# Patient Record
Sex: FEMALE | Race: WHITE | Employment: OTHER | URBAN - NONMETROPOLITAN AREA
[De-identification: names, ages, dates, MRNs, and addresses within clinical notes are randomized per-mention and may not be internally consistent; named-entity substitution may affect disease eponyms.]

---

## 2019-10-13 ENCOUNTER — TRANSFERRED RECORDS (OUTPATIENT)
Dept: HEALTH INFORMATION MANAGEMENT | Facility: OTHER | Age: 69
End: 2019-10-13

## 2019-12-19 ENCOUNTER — TRANSFERRED RECORDS (OUTPATIENT)
Dept: HEALTH INFORMATION MANAGEMENT | Facility: OTHER | Age: 69
End: 2019-12-19

## 2019-12-19 ENCOUNTER — MEDICAL CORRESPONDENCE (OUTPATIENT)
Dept: HEALTH INFORMATION MANAGEMENT | Facility: OTHER | Age: 69
End: 2019-12-19

## 2020-01-08 ENCOUNTER — HOSPITAL ENCOUNTER (OUTPATIENT)
Dept: CT IMAGING | Facility: OTHER | Age: 70
Discharge: HOME | End: 2020-01-08
Admitting: FAMILY MEDICINE
Payer: COMMERCIAL

## 2020-01-08 ENCOUNTER — HOSPITAL ENCOUNTER (OUTPATIENT)
Dept: CARDIOLOGY | Facility: OTHER | Age: 70
End: 2020-01-08
Payer: COMMERCIAL

## 2020-01-08 VITALS — BODY MASS INDEX: 32.15 KG/M2 | WEIGHT: 193 LBS | HEIGHT: 65 IN

## 2020-01-08 DIAGNOSIS — Z95.1 STATUS POST CORONARY ARTERY BYPASS GRAFT: ICD-10-CM

## 2020-01-08 DIAGNOSIS — I10 HYPERTENSION, UNSPECIFIED TYPE: ICD-10-CM

## 2020-01-08 DIAGNOSIS — Z95.0 PACEMAKER: ICD-10-CM

## 2020-01-08 DIAGNOSIS — R31.9 HEMATURIA: ICD-10-CM

## 2020-01-08 DIAGNOSIS — Z95.2 HX OF AORTIC VALVE REPLACEMENT: ICD-10-CM

## 2020-01-08 DIAGNOSIS — R06.02 SHORTNESS OF BREATH: ICD-10-CM

## 2020-01-08 DIAGNOSIS — Z95.2 HISTORY OF MITRAL VALVE REPLACEMENT: ICD-10-CM

## 2020-01-08 PROCEDURE — 93306 TTE W/DOPPLER COMPLETE: CPT | Mod: 26 | Performed by: INTERNAL MEDICINE

## 2020-01-08 PROCEDURE — 93306 TTE W/DOPPLER COMPLETE: CPT

## 2020-01-08 PROCEDURE — 25500064 ZZH RX 255 OP 636

## 2020-01-08 PROCEDURE — 74178 CT ABD&PLV WO CNTR FLWD CNTR: CPT

## 2020-01-08 RX ADMIN — IOHEXOL 100 ML: 350 INJECTION, SOLUTION INTRAVENOUS at 14:46

## 2020-01-08 ASSESSMENT — MIFFLIN-ST. JEOR: SCORE: 1410.7

## 2020-01-14 RX ORDER — WARFARIN SODIUM 2.5 MG/1
5 TABLET ORAL DAILY
COMMUNITY
Start: 2019-09-11

## 2020-01-14 RX ORDER — ACETAMINOPHEN 500 MG
1000 TABLET ORAL
COMMUNITY
Start: 2019-09-11

## 2020-01-14 RX ORDER — ALBUTEROL SULFATE 90 UG/1
AEROSOL, METERED RESPIRATORY (INHALATION)
COMMUNITY
Start: 2019-12-19

## 2020-01-14 RX ORDER — LEVOTHYROXINE SODIUM 88 UG/1
88 TABLET ORAL
COMMUNITY
Start: 2019-09-25

## 2020-01-14 RX ORDER — FUROSEMIDE 40 MG
20 TABLET ORAL DAILY
COMMUNITY
Start: 2019-12-30 | End: 2020-01-15

## 2020-01-14 RX ORDER — ASPIRIN 81 MG/1
81 TABLET ORAL
COMMUNITY
Start: 2007-03-08

## 2020-01-14 RX ORDER — INSULIN GLARGINE 100 [IU]/ML
INJECTION, SOLUTION SUBCUTANEOUS
COMMUNITY
Start: 2019-12-09

## 2020-01-14 RX ORDER — ATENOLOL 25 MG/1
12.5 TABLET ORAL DAILY
COMMUNITY
Start: 2020-01-08 | End: 2020-01-15

## 2020-01-14 RX ORDER — OMEPRAZOLE 40 MG/1
40 CAPSULE, DELAYED RELEASE ORAL
COMMUNITY
Start: 2019-03-25

## 2020-01-14 RX ORDER — POTASSIUM CHLORIDE 750 MG/1
TABLET, EXTENDED RELEASE ORAL
COMMUNITY
Start: 2019-12-30

## 2020-01-14 RX ORDER — ATORVASTATIN CALCIUM 40 MG/1
40 TABLET, FILM COATED ORAL
COMMUNITY
Start: 2019-02-14

## 2020-01-14 RX ORDER — INSULIN ASPART 100 [IU]/ML
INJECTION, SOLUTION INTRAVENOUS; SUBCUTANEOUS
COMMUNITY
Start: 2020-01-01

## 2020-01-15 ENCOUNTER — OFFICE VISIT (OUTPATIENT)
Dept: UROLOGY | Facility: OTHER | Age: 70
End: 2020-01-15
Attending: UROLOGY
Payer: COMMERCIAL

## 2020-01-15 ENCOUNTER — OFFICE VISIT (OUTPATIENT)
Dept: CARDIOLOGY | Facility: OTHER | Age: 70
End: 2020-01-15
Payer: COMMERCIAL

## 2020-01-15 VITALS
BODY MASS INDEX: 32.49 KG/M2 | HEART RATE: 84 BPM | DIASTOLIC BLOOD PRESSURE: 88 MMHG | RESPIRATION RATE: 16 BRPM | WEIGHT: 195 LBS | SYSTOLIC BLOOD PRESSURE: 178 MMHG

## 2020-01-15 VITALS
HEIGHT: 65 IN | OXYGEN SATURATION: 97 % | RESPIRATION RATE: 18 BRPM | TEMPERATURE: 98.4 F | DIASTOLIC BLOOD PRESSURE: 76 MMHG | BODY MASS INDEX: 32.32 KG/M2 | HEART RATE: 88 BPM | WEIGHT: 194 LBS | SYSTOLIC BLOOD PRESSURE: 154 MMHG

## 2020-01-15 DIAGNOSIS — I48.91 POSTOPERATIVE ATRIAL FIBRILLATION (H): ICD-10-CM

## 2020-01-15 DIAGNOSIS — Z95.2 HISTORY OF MITRAL VALVE REPLACEMENT WITH MECHANICAL VALVE: ICD-10-CM

## 2020-01-15 DIAGNOSIS — K21.9 GASTROESOPHAGEAL REFLUX DISEASE WITHOUT ESOPHAGITIS: ICD-10-CM

## 2020-01-15 DIAGNOSIS — E11.22 CONTROLLED TYPE 2 DIABETES MELLITUS WITH CHRONIC KIDNEY DISEASE ON CHRONIC DIALYSIS, WITH LONG-TERM CURRENT USE OF INSULIN (H): ICD-10-CM

## 2020-01-15 DIAGNOSIS — S22.32XD CLOSED FRACTURE OF ONE RIB OF LEFT SIDE WITH ROUTINE HEALING, SUBSEQUENT ENCOUNTER: ICD-10-CM

## 2020-01-15 DIAGNOSIS — D64.9 ANEMIA, UNSPECIFIED TYPE: ICD-10-CM

## 2020-01-15 DIAGNOSIS — Z79.2 NEED FOR PROPHYLACTIC ANTIBIOTIC: ICD-10-CM

## 2020-01-15 DIAGNOSIS — I25.10 CORONARY ARTERY DISEASE INVOLVING NATIVE HEART WITHOUT ANGINA PECTORIS, UNSPECIFIED VESSEL OR LESION TYPE: Primary | ICD-10-CM

## 2020-01-15 DIAGNOSIS — I34.2 NONRHEUMATIC MITRAL VALVE STENOSIS: ICD-10-CM

## 2020-01-15 DIAGNOSIS — Z95.2 STATUS POST AORTIC VALVE REPLACEMENT: ICD-10-CM

## 2020-01-15 DIAGNOSIS — R52 PAIN IN PACEMAKER POCKET: ICD-10-CM

## 2020-01-15 DIAGNOSIS — Z95.1 HISTORY OF CORONARY ARTERY BYPASS GRAFT X 1: ICD-10-CM

## 2020-01-15 DIAGNOSIS — Z95.0 CARDIAC PACEMAKER IN SITU: ICD-10-CM

## 2020-01-15 DIAGNOSIS — C82.80: ICD-10-CM

## 2020-01-15 DIAGNOSIS — I10 ESSENTIAL HYPERTENSION: ICD-10-CM

## 2020-01-15 DIAGNOSIS — I27.20 PULMONARY HYPERTENSION (H): ICD-10-CM

## 2020-01-15 DIAGNOSIS — N18.6 CONTROLLED TYPE 2 DIABETES MELLITUS WITH CHRONIC KIDNEY DISEASE ON CHRONIC DIALYSIS, WITH LONG-TERM CURRENT USE OF INSULIN (H): ICD-10-CM

## 2020-01-15 DIAGNOSIS — R31.0 GROSS HEMATURIA: Primary | ICD-10-CM

## 2020-01-15 DIAGNOSIS — I97.89 POSTOPERATIVE ATRIAL FIBRILLATION (H): ICD-10-CM

## 2020-01-15 DIAGNOSIS — E03.9 HYPOTHYROIDISM, UNSPECIFIED TYPE: ICD-10-CM

## 2020-01-15 DIAGNOSIS — I35.0 SEVERE AORTIC STENOSIS: ICD-10-CM

## 2020-01-15 DIAGNOSIS — C81.90 HODGKIN LYMPHOMA, UNSPECIFIED HODGKIN LYMPHOMA TYPE, UNSPECIFIED BODY REGION (H): ICD-10-CM

## 2020-01-15 DIAGNOSIS — Z95.0 PACEMAKER: ICD-10-CM

## 2020-01-15 DIAGNOSIS — Z79.01 CHRONIC ANTICOAGULATION: ICD-10-CM

## 2020-01-15 DIAGNOSIS — R06.09 DOE (DYSPNEA ON EXERTION): ICD-10-CM

## 2020-01-15 DIAGNOSIS — Z99.2 CONTROLLED TYPE 2 DIABETES MELLITUS WITH CHRONIC KIDNEY DISEASE ON CHRONIC DIALYSIS, WITH LONG-TERM CURRENT USE OF INSULIN (H): ICD-10-CM

## 2020-01-15 DIAGNOSIS — Z79.4 CONTROLLED TYPE 2 DIABETES MELLITUS WITH CHRONIC KIDNEY DISEASE ON CHRONIC DIALYSIS, WITH LONG-TERM CURRENT USE OF INSULIN (H): ICD-10-CM

## 2020-01-15 PROBLEM — G35 MS (MULTIPLE SCLEROSIS) (H): Status: ACTIVE | Noted: 2020-01-15

## 2020-01-15 LAB
ALBUMIN SERPL-MCNC: 4.3 G/DL (ref 3.5–5.7)
ALP SERPL-CCNC: 67 U/L (ref 34–104)
ALT SERPL W P-5'-P-CCNC: 20 U/L (ref 7–52)
ANION GAP SERPL CALCULATED.3IONS-SCNC: 7 MMOL/L (ref 3–14)
AST SERPL W P-5'-P-CCNC: 56 U/L (ref 13–39)
BILIRUB SERPL-MCNC: 1.2 MG/DL (ref 0.3–1)
BUN SERPL-MCNC: 36 MG/DL (ref 7–25)
CALCIUM SERPL-MCNC: 9.9 MG/DL (ref 8.6–10.3)
CHLORIDE SERPL-SCNC: 109 MMOL/L (ref 98–107)
CO2 SERPL-SCNC: 25 MMOL/L (ref 21–31)
CREAT SERPL-MCNC: 1.16 MG/DL (ref 0.6–1.2)
ERYTHROCYTE [DISTWIDTH] IN BLOOD BY AUTOMATED COUNT: 24.1 % (ref 10–15)
FERRITIN SERPL-MCNC: 35 NG/ML (ref 23.9–336.2)
GFR SERPL CREATININE-BSD FRML MDRD: 47 ML/MIN/{1.73_M2}
GLUCOSE SERPL-MCNC: 136 MG/DL (ref 70–105)
HCT VFR BLD AUTO: 29.1 % (ref 35–47)
HGB BLD-MCNC: 8.6 G/DL (ref 11.7–15.7)
IRON SATN MFR SERPL: 10 % (ref 20–55)
IRON SERPL-MCNC: 42 UG/DL (ref 50–212)
MAGNESIUM SERPL-MCNC: 2.2 MG/DL (ref 1.9–2.7)
MCH RBC QN AUTO: 28.6 PG (ref 26.5–33)
MCHC RBC AUTO-ENTMCNC: 29.6 G/DL (ref 31.5–36.5)
MCV RBC AUTO: 97 FL (ref 78–100)
NT-PROBNP SERPL-MCNC: 479 PG/ML (ref 0–100)
PLATELET # BLD AUTO: 384 10E9/L (ref 150–450)
POTASSIUM SERPL-SCNC: 4.6 MMOL/L (ref 3.5–5.1)
PROT SERPL-MCNC: 7.9 G/DL (ref 6.4–8.9)
RBC # BLD AUTO: 3.01 10E12/L (ref 3.8–5.2)
SODIUM SERPL-SCNC: 141 MMOL/L (ref 134–144)
TIBC SERPL-MCNC: 432.6 UG/DL (ref 245–400)
UIBC (UNSATURATED): 390.6 MG/DL
WBC # BLD AUTO: 7.2 10E9/L (ref 4–11)

## 2020-01-15 PROCEDURE — 88112 CYTOPATH CELL ENHANCE TECH: CPT

## 2020-01-15 PROCEDURE — 83550 IRON BINDING TEST: CPT | Mod: ZL | Performed by: NURSE PRACTITIONER

## 2020-01-15 PROCEDURE — 85027 COMPLETE CBC AUTOMATED: CPT | Mod: ZL | Performed by: NURSE PRACTITIONER

## 2020-01-15 PROCEDURE — G0463 HOSPITAL OUTPT CLINIC VISIT: HCPCS | Mod: 25

## 2020-01-15 PROCEDURE — 82728 ASSAY OF FERRITIN: CPT | Mod: ZL | Performed by: NURSE PRACTITIONER

## 2020-01-15 PROCEDURE — 99203 OFFICE O/P NEW LOW 30 MIN: CPT | Performed by: NURSE PRACTITIONER

## 2020-01-15 PROCEDURE — 83540 ASSAY OF IRON: CPT | Mod: ZL | Performed by: NURSE PRACTITIONER

## 2020-01-15 PROCEDURE — 83880 ASSAY OF NATRIURETIC PEPTIDE: CPT | Mod: ZL | Performed by: NURSE PRACTITIONER

## 2020-01-15 PROCEDURE — 99204 OFFICE O/P NEW MOD 45 MIN: CPT | Mod: 25 | Performed by: UROLOGY

## 2020-01-15 PROCEDURE — 80053 COMPREHEN METABOLIC PANEL: CPT | Mod: ZL | Performed by: NURSE PRACTITIONER

## 2020-01-15 PROCEDURE — 93000 ELECTROCARDIOGRAM COMPLETE: CPT | Performed by: INTERNAL MEDICINE

## 2020-01-15 PROCEDURE — 36415 COLL VENOUS BLD VENIPUNCTURE: CPT | Mod: ZL | Performed by: NURSE PRACTITIONER

## 2020-01-15 PROCEDURE — 83735 ASSAY OF MAGNESIUM: CPT | Mod: ZL | Performed by: NURSE PRACTITIONER

## 2020-01-15 PROCEDURE — 52000 CYSTOURETHROSCOPY: CPT | Performed by: UROLOGY

## 2020-01-15 RX ORDER — BUDESONIDE AND FORMOTEROL FUMARATE DIHYDRATE 160; 4.5 UG/1; UG/1
2 AEROSOL RESPIRATORY (INHALATION) 2 TIMES DAILY
COMMUNITY
Start: 2020-01-10 | End: 2020-01-15

## 2020-01-15 RX ORDER — NITROGLYCERIN 0.4 MG/1
0.4 TABLET SUBLINGUAL DAILY PRN
COMMUNITY
Start: 2019-07-18

## 2020-01-15 RX ORDER — FUROSEMIDE 40 MG
40 TABLET ORAL DAILY
Qty: 90 TABLET | Refills: 3 | Status: SHIPPED | OUTPATIENT
Start: 2020-01-15

## 2020-01-15 RX ORDER — AMOXICILLIN 500 MG/1
CAPSULE ORAL
Qty: 20 CAPSULE | Refills: 0 | Status: SHIPPED | OUTPATIENT
Start: 2020-01-15

## 2020-01-15 RX ORDER — METOPROLOL SUCCINATE 25 MG/1
12.5 TABLET, EXTENDED RELEASE ORAL DAILY
Qty: 60 TABLET | Refills: 1 | Status: SHIPPED | OUTPATIENT
Start: 2020-01-15 | End: 2020-02-17

## 2020-01-15 RX ORDER — FERROUS GLUCONATE 324(38)MG
324 TABLET ORAL
Qty: 60 TABLET | Refills: 3 | Status: SHIPPED | OUTPATIENT
Start: 2020-01-15 | End: 2020-05-20

## 2020-01-15 ASSESSMENT — PAIN SCALES - GENERAL
PAINLEVEL: NO PAIN (0)
PAINLEVEL: MILD PAIN (2)

## 2020-01-15 ASSESSMENT — MIFFLIN-ST. JEOR: SCORE: 1415.24

## 2020-01-15 NOTE — PROGRESS NOTES
Type of Visit  NPV    Chief Complaint  Hematuria, gross    HPI  Ms. Mcpherson is a 69 year old female who presents with gross hematuria.  Gross hematuria started 2 weeks ago.  The gross hematuria has been persistent since it initially developed but it does fluctuate from day-to-day.  Patient denies associated dysuria at the time of onset.  Patient denies clots associated with hematuria.    The patient has recently received aortic and mitral valve replacements with pacemaker insertion and CABG.  She is chronically anticoagulated.    Hematuria-related signs/symptoms  History of smoking?    No  History of chemotherapy?   No  History of pelvic radiation?   No  History of kidney or bladder stones?  No  History of frequent urinary tract infections? No      Past Medical History  She  has a past medical history of Hodgkin's lymphoma (H) (1979).  Patient Active Problem List   Diagnosis     Coronary artery disease involving native heart without angina pectoris, unspecified vessel or lesion type     History of coronary artery bypass graft x 1 (8/30/2019)     Status post aortic valve replacement (8/30/2019)     Severe aortic stenosis     History of mitral valve replacement with mechanical valve (8/30/2019)     MS (multiple sclerosis) (H)     Pulmonary hypertension (H)     Cardiac pacemaker in situ (9/10/2019)     Postoperative atrial fibrillation (H)     Closed fracture of one rib of left side with routine healing, subsequent encounter     Controlled type 2 diabetes mellitus with chronic kidney disease on chronic dialysis, with long-term current use of insulin (H)     Hodgkin lymphoma, unspecified Hodgkin lymphoma type, unspecified body region (H) (1970)     Follicular large cell lymphoma (H)     SCHWAB (dyspnea on exertion)     Essential hypertension     Hypothyroidism, unspecified type     Gastroesophageal reflux disease without esophagitis     Pain in pacemaker pocket     Chronic anticoagulation     Need for prophylactic  antibiotic- Dental procedures with mechanical valve       Past Surgical History  She  has a past surgical history that includes aortic valve replacement (08/2019); Release carpal tunnel bilateral; Oophorectomy; mitral valve replacement; tubal ligation; and left ear drum repair (2003).    Medications  She has a current medication list which includes the following prescription(s): acetaminophen, amoxicillin, aspirin, atorvastatin, furosemide, glucagon, lantus solostar, levothyroxine, metoprolol succinate er, nitroglycerin, novolog flexpen, omeprazole, potassium chloride er, ventolin hfa, and warfarin anticoagulant.    Allergies  Allergies   Allergen Reactions     Lisinopril Cough     Metformin Unknown     Intolerance-stomach aches  intolerance       Glipizide Itching and Rash       Social History  She  reports that she has never smoked. She has never used smokeless tobacco. She reports previous alcohol use. She reports that she does not use drugs.  No drug abuse.    Family History  No family history on file.    Review of Systems  I personally reviewed the ROS with the patient.    Nursing Notes:   Opal Álvarez RN  1/15/2020  1:15 PM  Signed  Review of Systems:    Weight loss:    No     Recent fever/chills:  No   Night sweats:   No  Current skin rash:  No   Recent hair loss:  No  Heat intolerance:  No   Cold intolerance:  No  Chest pain:   No   Palpitations:   No  Shortness of breath:  Yes   Wheezing:   Yes  Constipation:    No   Diarrhea:   No   Nausea:   No   Vomiting:   No   Kidney/side pain:  No   Back pain:   No  Frequent headaches:  No   Dizziness:     Yes  Leg swelling:   No   Calf pain:    No    Parents, brothers or sisters with history of kidney cancer:   No  Parents, brothers or sisters with history of bladder cancer: No    Opal Álvarez RN  1/15/2020  1:15 PM  Signed  Patient states she just saw Cardiology today and was started on metoprolol for her blood pressure.  Opal Álvarez RN......January  15, 2020...1:15 PM     Physical Exam  Vitals:    01/15/20 1308 01/15/20 1314   BP: (!) 188/98 (!) 178/88   BP Location: Left arm Left arm   Patient Position: Sitting Sitting   Cuff Size: Adult Regular Adult Regular   Pulse: 84    Resp: 16    Weight: 88.5 kg (195 lb)      Constitutional: NAD, WDWN.   Head: NCAT  Eyes: Conjunctivae normal  Cardiovascular: Regular rate.  Pulmonary/Chest: Respirations are even and non-labored bilaterally.  Abdominal: Soft. No distension, tenderness, masses or guarding. No CVA tenderness.  Neurological: A + O x 3.  Cranial Nerves II-XII grossly intact.  Extremities: HUEY x 4, Warm. No clubbing.  No cyanosis.    Skin: Pink, warm and dry.  No rashes noted.  Psychiatric:  Normal mood and affect  Genitourinary:  Nonpalpable bladder    ^^^^^^^^^^^^^^^^^^^^^^^^^^^^^^^^^^^^^^^^^^^^^^^^^^^^^^^^^^^^^^^^^^^^^^^^^^^^^^^^^^^  Preprocedure diagnosis  Hematuria    Postprocedure diagnosis  Hematuria    Procedure  Flexible Cystourethroscopy    Surgeon  Jones Swan MD    Anesthesia  2% lidocaine jelly intraurethrally    Complications  None    Indications  69 year old female undergoing a flexible cystoscopy for the above mentioned indications.    Findings  Cystoscopic findings included a normal urethra.    The bladder appeared to be normal capacity.    There were no tumors, stones or foreign bodies.    The orifices were slit-shaped and in their normal location.    Procedure  The patient was placed in supine position and prepped and draped in sterile fashion with lidocaine jelly per urethra for anesthesia.    I passed a lubricated 14F flexible cystoscope through the urethra and into the bladder and the bladder was completely visualized.  The cystoscope was retroflexed and the bladder neck visualized.    The cystoscope was slowly withdrawn while visualizing the urethra and the procedure terminated.    The patient tolerated the procedure well.       ^^^^^^^^^^^^^^^^^^^^^^^^^^^^^^^^^^^^^^^^^^^^^^^^^^^^^^^^^^^^^^^^^^^^^^^^^^^^^^^^^^^    Labs  Results for orders placed or performed in visit on 01/15/20   Magnesium     Status: None   Result Value Ref Range    Magnesium 2.2 1.9 - 2.7 mg/dL   N terminal pro BNP outpatient     Status: Abnormal   Result Value Ref Range    N-Terminal Pro Bnp 479 (H) 0 - 100 pg/mL   CBC with platelets     Status: Abnormal   Result Value Ref Range    WBC 7.2 4.0 - 11.0 10e9/L    RBC Count 3.01 (L) 3.8 - 5.2 10e12/L    Hemoglobin 8.6 (L) 11.7 - 15.7 g/dL    Hematocrit 29.1 (L) 35.0 - 47.0 %    MCV 97 78 - 100 fl    MCH 28.6 26.5 - 33.0 pg    MCHC 29.6 (L) 31.5 - 36.5 g/dL    RDW 24.1 (H) 10.0 - 15.0 %    Platelet Count 384 150 - 450 10e9/L   Comprehensive metabolic panel     Status: Abnormal   Result Value Ref Range    Sodium 141 134 - 144 mmol/L    Potassium 4.6 3.5 - 5.1 mmol/L    Chloride 109 (H) 98 - 107 mmol/L    Carbon Dioxide 25 21 - 31 mmol/L    Anion Gap 7 3 - 14 mmol/L    Glucose 136 (H) 70 - 105 mg/dL    Urea Nitrogen 36 (H) 7 - 25 mg/dL    Creatinine 1.16 0.60 - 1.20 mg/dL    GFR Estimate 47 (L) >60 mL/min/[1.73_m2]    GFR Estimate If Black 56 (L) >60 mL/min/[1.73_m2]    Calcium 9.9 8.6 - 10.3 mg/dL    Bilirubin Total 1.2 (H) 0.3 - 1.0 mg/dL    Albumin 4.3 3.5 - 5.7 g/dL    Protein Total 7.9 6.4 - 8.9 g/dL    Alkaline Phosphatase 67 34 - 104 U/L    ALT 20 7 - 52 U/L    AST 56 (H) 13 - 39 U/L       Imaging  CT a/p ([facility])  1/8/2020  Report and images are not available however the patient reports she was not told anything was abnormal    Assessment  Ms. Mcpherson is a 69 year old female with gross hematuria who recently underwent a CT scan and underwent cystoscopy today.    Gross hematuria was visible in the bladder today.  At this point we do not have a source so I collected urine cytology.    Discussed rationale for work up.  Discussed potential findings of hematuria work up including, but not limited to, kidney stones,  bladder tumors and/or kidney tumors.  Also discussed the potential for a normal work up.    Plan  Follow-up cytology results from today  Acquire report of CT scan recently performed  Follow-up with me in 3 months with a UA prior

## 2020-01-15 NOTE — NURSING NOTE
Review of Systems:    Weight loss:    No     Recent fever/chills:  No   Night sweats:   No  Current skin rash:  No   Recent hair loss:  No  Heat intolerance:  No   Cold intolerance:  No  Chest pain:   No   Palpitations:   No  Shortness of breath:  Yes   Wheezing:   Yes  Constipation:    No   Diarrhea:   No   Nausea:   No   Vomiting:   No   Kidney/side pain:  No   Back pain:   No  Frequent headaches:  No   Dizziness:     Yes  Leg swelling:   No   Calf pain:    No    Parents, brothers or sisters with history of kidney cancer:   No  Parents, brothers or sisters with history of bladder cancer: No

## 2020-01-15 NOTE — NURSING NOTE
"Chief Complaint   Patient presents with     Consult     SOB/ Hypertension       Initial BP (!) 154/76 (BP Location: Right arm, Patient Position: Sitting, Cuff Size: Adult Large)   Pulse 88   Temp 98.4  F (36.9  C) (Tympanic)   Resp 18   Ht 1.65 m (5' 4.96\")   Wt 88 kg (194 lb)   SpO2 97%   BMI 32.32 kg/m   Estimated body mass index is 32.32 kg/m  as calculated from the following:    Height as of this encounter: 1.65 m (5' 4.96\").    Weight as of this encounter: 88 kg (194 lb).  Meds Reconciled: complete  Pt is on Aspirin  Pt is on a Statin  PHQ and/or ANGEL reviewed. Pt referred to PCP/MH Provider as appropriate.    Betty Cruz LPN      "

## 2020-01-15 NOTE — PROGRESS NOTES
[facility] HEART CARE   CARDIOLOGY CONSULT     Heather Mcpherson   [address] [facility] [address]      Alyssa Carlisle     Chief Complaint   Patient presents with     Consult     SOB/ Hypertension        HPI:   Ms. Mcpherson is a 69 year old female who presents for cardiology evaluation status post aortic valve replacement, mitral valve replacement and single-vessel CABG on 8/30/2019 at [facility].  Patient has a history of coronary artery disease, history of single-vessel CABG, history of aortic valve replacement with severe aortic stenosis, history of mitral valve replacement with nonrheumatic mitral stenosis, pulmonary hypertension, cardiac pacemaker, postoperative atrial fibrillation following valve repair, history of left rib fracture following fall, DM 2 with insulin dependence, history of Hodgkin's lymphoma, follicular large cell lymphoma, hypertension, hypothyroidism, splenectomy in 1979 and GERD.    She lives in [place], [place] near [place]. She is . She has 4 kids. 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 No excessive alcohol use. No illicit drug use. Never smoker. She would like to establish cardiology care at [facility].     History:  1. Coronary artery disease s/p coronary artery bypass graft x 1 (8/30/19)              A. S/p balloon angioplasty of ostial RCA (7/16/2019)  2. Status post Aortic valve replacement with 23 mm LivaNova Top Hat mechanical valve for Low flow low gradient severe aortic stenosis (8/30/19)  3. Status post 27 mm St. Reji mechanical mitral valve replacement for mitral stenosis  (8/30/19)  4. Pulmonary hypertension              A. [facility] 7/16/2019: RAP 5, PA 41/18/28, PAWP 16, Karime CO/CI 5.17/2.54, PVR 1.55 wood units  5. Dual chamber pacemaker (9/10/2019)- secondary to heart block  6. Postoperative atrial fibrillation (9/2019)- Resolved  7. Left rib fractures (6/2019)  8. Diabetes mellitus 2, Insulin  Dependent  9. Hodgkin's Lymphoma s/p radiation ('s)     Patient admits that she continues to feel short of breath, fatigued and limited energy level following AVR, MVR and CABG x1. She describes this as variable, some days better then others. She completed short duration of rehab. Admits had been struggling with hypotension shortly after valve repair which has stabilized now. She denies any chest pain or pressure. Today she does report increased LE edema, no reports of orthopnea or PND.  Her exercise capacity has mildly improved.  She denies any lightheadedness or recent syncope.    With mechanical valves she will require lifelong oral anticoagulation with coumadin and will require bridging. for any procedures. Goal INR 2.5-3.5. She will also require SBE.prophylaxis with all dental cleanings and procedures.     She has been seen for device check of dual chamber PPM at [facility] [place] with [facility], she is due for device check. She does complain of some discomfort at pacer pocket which has not improved.     She reports never knowing she had valvular disease. Admits this past summer at the end of August she was seen in the ED with pain when breathing following her fall which resulted in rib fractures. At that time the performed EKG and suspected active ischemia. She was taken by helicopter to [facility]. When she arrived at [facility] she was told that she likely did not have MI or active ischemic or acute coronary event at that time. They did hospitalze her at [facility] for monitoring which is when her mitral and aortic valve stenosis where identified.     IMAGING RESULTS:   [facility]  [address] [place] [address]     Name: BRIGID MCGOWAN  MRN: [id number]  : 1950  Study Date: 2020 03:08 PM  Age: 69 yrs  Gender: Female  Patient Location: [facility]  Reason For Study: Shortness of breath, Hypertension, unspecified type, History  of  Ordering Physician: LIZ  MERLIN  Referring Physician: MERLIN HILL  Performed By: Annalise Dalal RDCS, RVT     BSA: 1.9 m2  Height: 65 in  Weight: 193 lb  HR: 86  BP: 183/84 mmHg  _____________________________________________________________________________  __        Procedure  Echocardiogram with two-dimensional, color and spectral Doppler performed.  _____________________________________________________________________________  __        Interpretation Summary  Global and regional left ventricular function is hyperkinetic with an EF of  65-70%.  Right ventricular function, chamber size, wall motion, and thickness are  normal.  A mechanical mitral valve is present.  Mean mitral gradient 7mmHg at heart rate 82BPM.  A mechanical aortic valve is present.  Doppler interrogation of the aortic valve is normal.  The inferior vena cava is normal.  No pericardial effusion is present.  _____________________________________________________________________________  __        Left Ventricle  Global and regional left ventricular function is hyperkinetic with an EF of  65-70%. Left ventricular wall thickness is normal. Left ventricular size is  normal. Diastolic function not assessed due to presence of prosthetic mitral  valve. No regional wall motion abnormalities are seen.     Right Ventricle  Right ventricular function, chamber size, wall motion, and thickness are  normal.     Atria  The atria cannot be assessed.     Mitral Valve  A mechanical mitral valve is present. Mean mitral gradient 7mmHg at heart rate  82BPM.     Aortic Valve  The mean gradient across the aortic valve is6 mmHg. A mechanical aortic valve  is present. Doppler interrogation of the aortic valve is normal.        Tricuspid Valve  Moderate tricuspid insufficiency is present. The right ventricular systolic  pressure is approximated at 38.4 mmHg plus the right atrial pressure.     Pulmonic Valve  The pulmonic valve is normal. Trace pulmonic insufficiency is present.      Vessels  The aorta root is normal. The pulmonary artery and bifurcation cannot be  assessed. The inferior vena cava is normal.     Pericardium  No pericardial effusion is present.     Compared to Previous Study  Previous study not available for comparison.     _____________________________________________________________________________  __  MMode/2D Measurements & Calculations  IVSd: 1.1 cm  LVIDd: 3.3 cm  LVIDs: 2.4 cm  LVPWd: 0.85 cm  FS: 29.6 %  LV mass(C)d: 93.9 grams  LV mass(C)dI: 48.2 grams/m2     Ao root diam: 3.2 cm  asc Aorta Diam: 3.1 cm  LVOT diam: 2.0 cm  LVOT area: 3.1 cm2  LA Volume (BP): 55.7 ml  LA Volume Index (BP): 28.6 ml/m2  RWT: 0.51        Doppler Measurements & Calculations  MV max P.5 mmHg  MV mean P.0 mmHg  MV V2 VTI: 38.1 cm  MVA(VTI): 1.8 cm2  LV V1 max P.2 mmHg  LV V1 max: 103.0 cm/sec  LV V1 VTI: 21.3 cm     SV(LVOT): 66.9 ml  SI(LVOT): 34.3 ml/m2  TR max stanford: 309.7 cm/sec  TR max P.4 mmHg           _____________________________________________________________________________  __           Report approved by: Amalia Jiménez 2020 03:58 PM     PAST MEDICAL HISTORY:   Past Medical History:   Diagnosis Date     Hodgkin's lymphoma (H)           FAMILY HISTORY:   No family history on file.       PAST SURGICAL HISTORY:   Past Surgical History:   Procedure Laterality Date     AORTIC VALVE REPLACEMENT  2019     left ear drum repair  2003     MITRAL VALVE REPLACEMENT       OOPHORECTOMY       RELEASE CARPAL TUNNEL BILATERAL       TUBAL LIGATION            SOCIAL HISTORY:   Social History     Socioeconomic History     Marital status:      Spouse name: None     Number of children: None     Years of education: None     Highest education level: None   Occupational History     None   Social Needs     Financial resource strain: None     Food insecurity:     Worry: None     Inability: None     Transportation needs:     Medical: None     Non-medical: None    Tobacco Use     Smoking status: Never Smoker     Smokeless tobacco: Never Used   Substance and Sexual Activity     Alcohol use: Not Currently     Drug use: Never     Sexual activity: Not Currently   Lifestyle     Physical activity:     Days per week: None     Minutes per session: None     Stress: None   Relationships     Social connections:     Talks on phone: None     Gets together: None     Attends [employer] service: None     Active member of club or organization: None     Attends meetings of clubs or organizations: None     Relationship status: None     Intimate partner violence:     Fear of current or ex partner: None     Emotionally abused: None     Physically abused: None     Forced sexual activity: None   Other Topics Concern     Parent/sibling w/ CABG, MI or angioplasty before 65F 55M? Not Asked   Social History Narrative     None          CURRENT MEDICATIONS:   Prior to Admission medications    Medication Sig Start Date End Date Taking? Authorizing Provider   acetaminophen (TYLENOL) 500 MG tablet Take 1,000 mg by mouth 9/11/19  Yes Reported, Patient   aspirin 81 MG EC tablet Take 81 mg by mouth 3/8/07  Yes Reported, Patient   atenolol (TENORMIN) 25 MG tablet Take 12.5 mg by mouth daily  1/8/20  Yes Reported, Patient   atorvastatin (LIPITOR) 40 MG tablet Take 40 mg by mouth 2/14/19  Yes Reported, Patient   furosemide (LASIX) 40 MG tablet Take 20 mg by mouth daily  12/30/19  Yes Reported, Patient   glucagon 1 MG kit Administer IM Glucagon 1mg for unresponsive low blood glucose 10/4/19  Yes Reported, Patient   LANTUS SOLOSTAR 100 UNIT/ML soln INJECT 29 UNITS SUBCUTANEOUSLY EVERY MORNING AND EVERY EVENING 12/9/19  Yes Reported, Patient   levothyroxine (SYNTHROID) 88 MCG tablet Take 88 mcg by mouth 9/25/19  Yes Reported, Patient   nitroGLYcerin (NITROSTAT) 0.4 MG sublingual tablet Place 0.4 mg under the tongue daily as needed 7/18/19  Yes Reported, Patient   NOVOLOG FLEXPEN 100 UNIT/ML soln  1/1/20  Yes  "Reported, Patient   omeprazole (PRILOSEC) 40 MG DR capsule Take 40 mg by mouth 3/25/19  Yes Reported, Patient   potassium chloride ER (K-DUR/KLOR-CON M) 10 MEQ CR tablet  12/30/19  Yes Reported, Patient   SYMBICORT 160-4.5 MCG/ACT Inhaler Inhale 2 puffs into the lungs 2 times daily 1/10/20  Yes Reported, Patient   VENTOLIN  (90 Base) MCG/ACT inhaler INHALE 2 PUFFS EVERY 4 HOURS AS NEEDED FOR COUGHING OR WHEEZING 12/19/19  Yes Reported, Patient   warfarin ANTICOAGULANT (COUMADIN) 2.5 MG tablet Take 5 mg by mouth daily  9/11/19  Yes Reported, Patient          ALLERGIES:   Allergies   Allergen Reactions     Lisinopril Cough     Metformin Unknown     Intolerance-stomach aches  intolerance       Glipizide Itching and Rash          ROS:   CONSTITUTIONAL: No reported fever or chills. No changes in weight.  ENT: No visual disturbance, ear ache, epistaxis or sore throat.   CARDIOVASCULAR: No chest pain, chest pressure or chest discomfort. No palpitations. Positive for lower extremity edema.   RESPIRATORY: Positive for chronic shortness of breath and dyspnea upon exertion. No cough, wheezing or hemoptysis.  No orthopnea or PND.  GI: No abdominal pain, melena or hematochezia reported.  : No hematuria or dysuria.   NEUROLOGICAL: No lightheadedness, dizziness, syncope, ataxia, paresthesias or weakness.   HEMATOLOGIC: No history of anemia. No bleeding or excessive bruising.   MUSCULOSKELETAL: No new joint pain or swelling, no muscle pain.  ENDOCRINOLOGIC: No temperature intolerance. No hair or skin changes.  SKIN: No abnormal rashes or sores, no unusual itching. Positive for discomfort at pacer pocket site.  PSYCHIATRIC: No history of depression or anxiety. No changes in mood, feeling down or anxious.       PHYSICAL EXAM:   BP (!) 154/76 (BP Location: Right arm, Patient Position: Sitting, Cuff Size: Adult Large)   Pulse 88   Temp 98.4  F (36.9  C) (Tympanic)   Resp 18   Ht 1.65 m (5' 4.96\")   Wt 88 kg (194 lb)   " SpO2 97%   BMI 32.32 kg/m    GENERAL: The patient is a well-developed, well-nourished, in no apparent distress.  HEENT: Head is normocephalic and atraumatic. Eyes are symmetrical with normal visual tracking. No icterus, no xanthelasmas. Nares appeared normal without nasal drainage. Mucous membranes are moist, no cyanosis.  NECK: Supple. No cervical bruits, JVP not visible.   CHEST/ LUNGS: Lungs clear to auscultation, no rales, rhonchi or wheezes, no use of accessory muscles, no retractions, respirations unlabored and normal respiratory rate.   CARDIO: Regular rate and rhythm normal with S1 and S2, no S3 or S4 and no murmur, click or rub.   ABD: Abdomen is soft and nontender, nondistended. Without hepatosplenomegaly, no palpable masses, aorta not enlarged to palpitation and no abdominal bruits heard.   EXTREMITIES: No clubbing, cyanosis or edema present. Peripheral pulses normal and equal bilaterally.  VASC: Radial, femoral, dorsalis pedis and posterior tibialis pulses normal and symmetric with no vascular bruits heard.  MUSCULOSKELETAL: No joint swelling.   NEUROLOGIC: Alert and oriented X3. Normal speech, gait and affect. No focal neurologic deficits.   SKIN: No jaundice. No rashes or visible skin lesions present. No ecchymosis.       EKG:    NSR, rate 82 bpm. Prolonged QT, QTc 490ms.     LAB RESULTS:   No results found for any previous visit.          ASSESSMENT:   Heather Mcpherson presents for cardiology evaluation status post aortic valve replacement, mitral valve replacement and single-vessel CABG on 8/30/2019 at [facility].  Patient has a history of coronary artery disease, history of single-vessel CABG, history of aortic valve replacement with severe aortic stenosis, history of mitral valve replacement with nonrheumatic mitral stenosis, pulmonary hypertension, cardiac pacemaker, postoperative atrial fibrillation following valve repair, history of left rib fracture following fall, DM 2 with  insulin dependence, history of Hodgkin's lymphoma, follicular large cell lymphoma, hypertension, hypothyroidism, splenectomy in 1979 and GERD.    PLAN:   1. Coronary artery disease involving native heart without angina pectoris, unspecified vessel or lesion type  S/p single vessel CABG (8/30/19), restenosis of ostial RCA  balloon angioplasty of ostial RCA 7/16/2019  Continue on ASA 81 mg daily.   Continue on Toprol XL for secondary prevention.   Continue on Atorvastatin 40 mg every night for plaque stabilization.     - EKG 12-lead, tracing only  - metoprolol succinate ER (TOPROL-XL) 25 MG 24 hr tablet; Take 0.5 tablets (12.5 mg) by mouth daily  Dispense: 60 tablet; Refill: 1    2. History of coronary artery bypass graft x 1 (8/30/2019)  See above.   - EKG 12-lead, tracing only    3. Status post aortic valve replacement (8/30/2019)  Status post Aortic valve replacement with 23 mm LivaNova Top Hat mechanical valve for Low flow low gradient severe aortic stenosis  TTE reviewed from 1/8/2020 with normal functioning AVR.     4. Severe aortic stenosis  See above.     5. History of mitral valve replacement with mechanical valve (8/30/2019)  Status post 27 mm St. Reji mechanical mitral valve replacement for mitral stenosis  TTE reviewed from 1/8/2020 with normal functioning MVR.     6. Nonrheumatic mitral valve stenosis  See above.     7. Pulmonary hypertension (H)  Pulmonary hypertension likely secondary to valvular heart disease  [facility] 7/16/2019: RAP 5, PA 41/18/28, PAWP 16, Karime CO/CI 5.17/2.54, PVR 1.55 wood unit    8. Cardiac pacemaker in situ (9/10/2019)  Dual chamber pacer (9/10/19)  Secondary to heart block  Patient will be scheduled to establish care with [facility] device clinic.     9. Postoperative atrial fibrillation (H)  Resolved   She does remain on warfarin life long with mechanical valves.    10. Closed fracture of one rib of left side with routine healing, subsequent encounter    11.  Controlled type 2 diabetes mellitus with chronic kidney disease on chronic dialysis, with long-term current use of insulin (H)  Continue management by PCP  No results found for: A1C   Last Comprehensive Metabolic Panel:  Sodium   Date Value Ref Range Status   01/15/2020 141 134 - 144 mmol/L Final     Potassium   Date Value Ref Range Status   01/15/2020 4.6 3.5 - 5.1 mmol/L Final     Chloride   Date Value Ref Range Status   01/15/2020 109 (H) 98 - 107 mmol/L Final     Carbon Dioxide   Date Value Ref Range Status   01/15/2020 25 21 - 31 mmol/L Final     Anion Gap   Date Value Ref Range Status   01/15/2020 7 3 - 14 mmol/L Final     Glucose   Date Value Ref Range Status   01/15/2020 136 (H) 70 - 105 mg/dL Final     Urea Nitrogen   Date Value Ref Range Status   01/15/2020 36 (H) 7 - 25 mg/dL Final     Creatinine   Date Value Ref Range Status   01/15/2020 1.16 0.60 - 1.20 mg/dL Final     GFR Estimate   Date Value Ref Range Status   01/15/2020 47 (L) >60 mL/min/[1.73_m2] Final     Calcium   Date Value Ref Range Status   01/15/2020 9.9 8.6 - 10.3 mg/dL Final         12. Hodgkin lymphoma, unspecified Hodgkin lymphoma type, unspecified body region (H) (1970)  1970's with radiation treatment    13. Follicular large cell lymphoma (H)  Has followed with oncology at [facility] in [place]    14. SCHWAB (dyspnea on exertion)  Continue shortness of breath and SCHWAB with pulmonary hypertension  PFT's ordered as well with methacholine challenge.    - Pulmonary Function Test; Future  - N terminal pro BNP outpatient  - CBC with platelets    15. Essential hypertension  BP well controlled.     16. Hypothyroidism, unspecified type  Continue on current dose of synthroid    17. Gastroesophageal reflux disease without esophagitis  On PPI with good control    18. Pain in pacemaker pocket  Describes pain in pacer pocket, no erosion. Tender to touch and pain with moving upper extremity on left. If this is not improved over next 4-6 weeks,  consider pocket revision.     19. Chronic anticoagulation  Life long coumadin oral anticoagulation with mechanical valves.   She will require bridging with lovenox for any procedure.    20. Need for prophylactic antibiotic- Dental procedures with valve  Life long SBE for dental procedure or cleanings.     - amoxicillin (AMOXIL) 500 MG capsule; Take 2,000 mg 60 min prior to any dental cleaning or procedure.  Dispense: 20 capsule; Refill: 0    21. Anemia, unspecified type  - ferrous gluconate (FERGON) 324 (38 Fe) MG tablet; Take 1 tablet (324 mg) by mouth daily (with breakfast)  Dispense: 60 tablet; Refill: 3  - Ferritin; Future  - Iron Binding Panel GH; Future    Follow-up with cardiology in 6 weeks, certainly sooner if needed.     >60 min spent reviewing history and continued plan of care.     Thank you for allowing me to participate in the care of your patient. Please do not hesitate to contact me if you have any questions.     Sharyn Cook, APRN CNP CFNP

## 2020-01-15 NOTE — PATIENT INSTRUCTIONS
You were seen by  SHALINI Alarcon CNP       1. Laboratory blood work has been ordered.   Notified today of lab results.       2. A PFT'S Pulmonary function test has been ordered. You will be called to schedule this. You will receive instructions for testing at that time. You will be contacted with results.      3. Stop Atenolol and start Metoprolol succinate ER (Toprol XL) 25mg take 0.5 tablet (12.5mg) once daily.    4. amoxicillin (AMOXIL) 500 MG capsule Take 2,000 mg 60 min prior to any dental cleaning or procedure.    5. Start ferrous gluconate (FERGON) 324 (38 Fe) MG tablet Take 1 tablet (324 mg) by mouth daily (with breakfast).    6. Increase Lasix from 20mg to 40mg once daily.    7. Recheck lab in 2 weeks.    You will follow up with [facility] Cardiology in 6 weeks, sooner if needed.       Please call the cardiology office with problems, questions, or concerns at [phone].    If you experience chest pain, chest pressure, chest tightness, shortness of breath, fainting, lightheadedness, nausea, vomiting, or other concerning symptoms, please report to the Emergency Department or call 911. These symptoms may be emergent, and best treated in the Emergency Department.     Cardiology Nurses  HENRIQUE Tovar, SRINIVAS TYLER, SRINIVAS  [facility] Cardiology (Unit [address]C)  [phone]

## 2020-01-15 NOTE — PATIENT INSTRUCTIONS

## 2020-01-15 NOTE — NURSING NOTE
Patient states she just saw Cardiology today and was started on metoprolol for her blood pressure.  Opal Álvarez RN......January 15, 2020...1:15 PM

## 2020-01-22 ENCOUNTER — TELEPHONE (OUTPATIENT)
Dept: CARDIOLOGY | Facility: OTHER | Age: 70
End: 2020-01-22

## 2020-01-22 DIAGNOSIS — D64.9 ANEMIA, UNSPECIFIED TYPE: Primary | ICD-10-CM

## 2020-01-22 NOTE — TELEPHONE ENCOUNTER
Sharyn Cook APRN CNP Green, Glenda M, RN   Caller: Unspecified (Today, 12:26 PM)             HGB would be perfect.   Thanks,   Sharyn GARCIA. SHALINI Cook CNP      Order was placed and signed.  Khloe Diaz RN on 1/22/2020 at 3:43 PM

## 2020-01-23 ENCOUNTER — HOSPITAL ENCOUNTER (OUTPATIENT)
Dept: RESPIRATORY THERAPY | Facility: OTHER | Age: 70
Discharge: HOME | End: 2020-01-23
Attending: NURSE PRACTITIONER | Admitting: NURSE PRACTITIONER
Payer: COMMERCIAL

## 2020-01-23 DIAGNOSIS — R06.09 DOE (DYSPNEA ON EXERTION): ICD-10-CM

## 2020-01-23 DIAGNOSIS — I27.20 PULMONARY HYPERTENSION (H): ICD-10-CM

## 2020-01-23 PROCEDURE — 94726 PLETHYSMOGRAPHY LUNG VOLUMES: CPT | Mod: 26 | Performed by: INTERNAL MEDICINE

## 2020-01-23 PROCEDURE — 94060 EVALUATION OF WHEEZING: CPT

## 2020-01-23 PROCEDURE — 94060 EVALUATION OF WHEEZING: CPT | Mod: 26 | Performed by: INTERNAL MEDICINE

## 2020-01-23 PROCEDURE — 40000275 ZZH STATISTIC RCP TIME EA 10 MIN

## 2020-01-23 PROCEDURE — 94729 DIFFUSING CAPACITY: CPT | Mod: 26 | Performed by: INTERNAL MEDICINE

## 2020-01-23 PROCEDURE — 94640 AIRWAY INHALATION TREATMENT: CPT

## 2020-01-23 PROCEDURE — 25000125 ZZHC RX 250

## 2020-01-23 PROCEDURE — 40000809 ZZH STATISTIC NO DOCUMENTATION TO SUPPORT CHARGE

## 2020-01-23 PROCEDURE — 94729 DIFFUSING CAPACITY: CPT

## 2020-01-23 PROCEDURE — 94726 PLETHYSMOGRAPHY LUNG VOLUMES: CPT

## 2020-01-23 RX ORDER — ALBUTEROL SULFATE 0.83 MG/ML
2.5 SOLUTION RESPIRATORY (INHALATION)
Status: COMPLETED | OUTPATIENT
Start: 2020-01-23 | End: 2020-01-23

## 2020-01-23 RX ADMIN — ALBUTEROL SULFATE 2.5 MG: 2.5 SOLUTION RESPIRATORY (INHALATION) at 14:26

## 2020-01-30 DIAGNOSIS — D64.9 ANEMIA, UNSPECIFIED TYPE: ICD-10-CM

## 2020-01-30 LAB — HGB BLD-MCNC: 8.6 G/DL (ref 11.7–15.7)

## 2020-01-30 PROCEDURE — 85018 HEMOGLOBIN: CPT | Mod: ZL | Performed by: NURSE PRACTITIONER

## 2020-01-30 PROCEDURE — 36415 COLL VENOUS BLD VENIPUNCTURE: CPT | Mod: ZL | Performed by: NURSE PRACTITIONER

## 2020-02-17 ENCOUNTER — OFFICE VISIT (OUTPATIENT)
Dept: CARDIOLOGY | Facility: OTHER | Age: 70
End: 2020-02-17
Attending: NURSE PRACTITIONER
Payer: COMMERCIAL

## 2020-02-17 ENCOUNTER — HOSPITAL ENCOUNTER (OUTPATIENT)
Dept: GENERAL RADIOLOGY | Facility: OTHER | Age: 70
Discharge: HOME | End: 2020-02-17
Attending: NURSE PRACTITIONER | Admitting: NURSE PRACTITIONER
Payer: COMMERCIAL

## 2020-02-17 VITALS
RESPIRATION RATE: 16 BRPM | TEMPERATURE: 97.9 F | SYSTOLIC BLOOD PRESSURE: 138 MMHG | BODY MASS INDEX: 32.32 KG/M2 | HEART RATE: 96 BPM | HEIGHT: 65 IN | WEIGHT: 194 LBS | OXYGEN SATURATION: 97 % | DIASTOLIC BLOOD PRESSURE: 68 MMHG

## 2020-02-17 DIAGNOSIS — E11.22 CONTROLLED TYPE 2 DIABETES MELLITUS WITH CHRONIC KIDNEY DISEASE ON CHRONIC DIALYSIS, WITH LONG-TERM CURRENT USE OF INSULIN (H): ICD-10-CM

## 2020-02-17 DIAGNOSIS — I25.10 CORONARY ARTERY DISEASE INVOLVING NATIVE HEART WITHOUT ANGINA PECTORIS, UNSPECIFIED VESSEL OR LESION TYPE: ICD-10-CM

## 2020-02-17 DIAGNOSIS — Z92.3 HISTORY OF RADIATION THERAPY: ICD-10-CM

## 2020-02-17 DIAGNOSIS — C82.80: ICD-10-CM

## 2020-02-17 DIAGNOSIS — R06.02 SHORTNESS OF BREATH: ICD-10-CM

## 2020-02-17 DIAGNOSIS — N18.6 CONTROLLED TYPE 2 DIABETES MELLITUS WITH CHRONIC KIDNEY DISEASE ON CHRONIC DIALYSIS, WITH LONG-TERM CURRENT USE OF INSULIN (H): ICD-10-CM

## 2020-02-17 DIAGNOSIS — I34.2 NONRHEUMATIC MITRAL VALVE STENOSIS: ICD-10-CM

## 2020-02-17 DIAGNOSIS — Z79.2 NEED FOR PROPHYLACTIC ANTIBIOTIC: ICD-10-CM

## 2020-02-17 DIAGNOSIS — Z95.1 HISTORY OF CORONARY ARTERY BYPASS GRAFT X 1: ICD-10-CM

## 2020-02-17 DIAGNOSIS — J98.4 RESTRICTIVE LUNG DISEASE: ICD-10-CM

## 2020-02-17 DIAGNOSIS — I10 ESSENTIAL HYPERTENSION: ICD-10-CM

## 2020-02-17 DIAGNOSIS — I97.89 POSTOPERATIVE ATRIAL FIBRILLATION (H): ICD-10-CM

## 2020-02-17 DIAGNOSIS — Z95.2 STATUS POST AORTIC VALVE REPLACEMENT: ICD-10-CM

## 2020-02-17 DIAGNOSIS — E03.9 HYPOTHYROIDISM, UNSPECIFIED TYPE: ICD-10-CM

## 2020-02-17 DIAGNOSIS — D64.9 ANEMIA, UNSPECIFIED TYPE: ICD-10-CM

## 2020-02-17 DIAGNOSIS — Z79.01 CHRONIC ANTICOAGULATION: ICD-10-CM

## 2020-02-17 DIAGNOSIS — Z79.4 CONTROLLED TYPE 2 DIABETES MELLITUS WITH CHRONIC KIDNEY DISEASE ON CHRONIC DIALYSIS, WITH LONG-TERM CURRENT USE OF INSULIN (H): ICD-10-CM

## 2020-02-17 DIAGNOSIS — I27.20 PULMONARY HYPERTENSION (H): ICD-10-CM

## 2020-02-17 DIAGNOSIS — I25.10 CORONARY ARTERY DISEASE INVOLVING NATIVE CORONARY ARTERY OF NATIVE HEART WITHOUT ANGINA PECTORIS: Primary | ICD-10-CM

## 2020-02-17 DIAGNOSIS — Z95.2 HISTORY OF MITRAL VALVE REPLACEMENT WITH MECHANICAL VALVE: ICD-10-CM

## 2020-02-17 DIAGNOSIS — R52 PAIN IN PACEMAKER POCKET: ICD-10-CM

## 2020-02-17 DIAGNOSIS — C81.90 HODGKIN LYMPHOMA, UNSPECIFIED HODGKIN LYMPHOMA TYPE, UNSPECIFIED BODY REGION (H): ICD-10-CM

## 2020-02-17 DIAGNOSIS — I48.91 POSTOPERATIVE ATRIAL FIBRILLATION (H): ICD-10-CM

## 2020-02-17 DIAGNOSIS — Z95.0 CARDIAC PACEMAKER IN SITU: ICD-10-CM

## 2020-02-17 DIAGNOSIS — Z99.2 CONTROLLED TYPE 2 DIABETES MELLITUS WITH CHRONIC KIDNEY DISEASE ON CHRONIC DIALYSIS, WITH LONG-TERM CURRENT USE OF INSULIN (H): ICD-10-CM

## 2020-02-17 DIAGNOSIS — I35.0 SEVERE AORTIC STENOSIS: ICD-10-CM

## 2020-02-17 LAB
ANION GAP SERPL CALCULATED.3IONS-SCNC: 9 MMOL/L (ref 3–14)
BUN SERPL-MCNC: 23 MG/DL (ref 7–25)
CALCIUM SERPL-MCNC: 10 MG/DL (ref 8.6–10.3)
CHLORIDE SERPL-SCNC: 103 MMOL/L (ref 98–107)
CO2 SERPL-SCNC: 27 MMOL/L (ref 21–31)
CREAT SERPL-MCNC: 1.03 MG/DL (ref 0.6–1.2)
ERYTHROCYTE [DISTWIDTH] IN BLOOD BY AUTOMATED COUNT: 24.7 % (ref 10–15)
GFR SERPL CREATININE-BSD FRML MDRD: 53 ML/MIN/{1.73_M2}
GLUCOSE SERPL-MCNC: 82 MG/DL (ref 70–105)
HCT VFR BLD AUTO: 33.7 % (ref 35–47)
HGB BLD-MCNC: 10 G/DL (ref 11.7–15.7)
MCH RBC QN AUTO: 29 PG (ref 26.5–33)
MCHC RBC AUTO-ENTMCNC: 29.7 G/DL (ref 31.5–36.5)
MCV RBC AUTO: 98 FL (ref 78–100)
PLATELET # BLD AUTO: 371 10E9/L (ref 150–450)
POTASSIUM SERPL-SCNC: 4.1 MMOL/L (ref 3.5–5.1)
RBC # BLD AUTO: 3.45 10E12/L (ref 3.8–5.2)
SODIUM SERPL-SCNC: 139 MMOL/L (ref 134–144)
WBC # BLD AUTO: 8.7 10E9/L (ref 4–11)

## 2020-02-17 PROCEDURE — 80048 BASIC METABOLIC PNL TOTAL CA: CPT | Mod: ZL | Performed by: NURSE PRACTITIONER

## 2020-02-17 PROCEDURE — G0463 HOSPITAL OUTPT CLINIC VISIT: HCPCS

## 2020-02-17 PROCEDURE — 99214 OFFICE O/P EST MOD 30 MIN: CPT | Performed by: NURSE PRACTITIONER

## 2020-02-17 PROCEDURE — 71046 X-RAY EXAM CHEST 2 VIEWS: CPT

## 2020-02-17 PROCEDURE — 85027 COMPLETE CBC AUTOMATED: CPT | Mod: ZL | Performed by: NURSE PRACTITIONER

## 2020-02-17 PROCEDURE — 36415 COLL VENOUS BLD VENIPUNCTURE: CPT | Mod: ZL | Performed by: NURSE PRACTITIONER

## 2020-02-17 RX ORDER — INSULIN DEGLUDEC 100 U/ML
INJECTION, SOLUTION SUBCUTANEOUS
COMMUNITY
Start: 2020-02-14

## 2020-02-17 RX ORDER — METOPROLOL SUCCINATE 25 MG/1
25 TABLET, EXTENDED RELEASE ORAL DAILY
Qty: 60 TABLET | Refills: 1 | Status: SHIPPED | OUTPATIENT
Start: 2020-02-17 | End: 2020-05-20

## 2020-02-17 ASSESSMENT — MIFFLIN-ST. JEOR: SCORE: 1415.24

## 2020-02-17 ASSESSMENT — PAIN SCALES - GENERAL: PAINLEVEL: NO PAIN (0)

## 2020-02-17 NOTE — NURSING NOTE
"Chief Complaint   Patient presents with     Follow Up     6 week follow up       Initial /68 (BP Location: Right arm, Patient Position: Sitting, Cuff Size: Adult Large)   Pulse 96   Temp 97.9  F (36.6  C) (Tympanic)   Resp 16   Ht 1.65 m (5' 4.96\")   Wt 88 kg (194 lb)   SpO2 97%   BMI 32.32 kg/m   Estimated body mass index is 32.32 kg/m  as calculated from the following:    Height as of this encounter: 1.65 m (5' 4.96\").    Weight as of this encounter: 88 kg (194 lb).  Meds Reconciled: complete  Pt is on Aspirin  Pt is on a Statin  PHQ and/or ANGEL reviewed. Pt referred to PCP/MH Provider as appropriate.    Betty Cruz LPN      "

## 2020-02-17 NOTE — PATIENT INSTRUCTIONS
You were seen by  SHALINI Alarcon CNP       1. A referral to Pulmonary Medicine  has been placed. You will be contacted by that facility to schedule an appointment.        2. Increase metoprolol succinate ER (TOPROL-XL) 25 MG 24 hr tablet Take 1 tablet (25 mg) by mouth daily     3. Will set up for Pacemaker clinic.    4. No other changes.        You will follow up with [facility] Cardiology in 3 months, sooner if needed.       Please call the cardiology office with problems, questions, or concerns at [phone].    If you experience chest pain, chest pressure, chest tightness, shortness of breath, fainting, lightheadedness, nausea, vomiting, or other concerning symptoms, please report to the Emergency Department or call 911. These symptoms may be emergent, and best treated in the Emergency Department.     Cardiology Nurses  HENRIQUE Tovar, SRINIVAS TYLER LPN  [facility] Cardiology (Unit 3C)  [phone]

## 2020-02-17 NOTE — PROGRESS NOTES
[facility] HEART CARE   CARDIOLOGY PROGRESS NOTE    Heather Mcpherson   [address] [facility] [address]      Alyssa Carlisle     Chief Complaint   Patient presents with     Follow Up     6 week follow up        HPI:   Ms. Mcpherson is a 69 year old female who presents for cardiology follow-up to visit on 1/15/2020 status post aortic valve replacement, mitral valve replacement and single-vessel CABG on 8/30/2019 at [facility].  Patient has a history of coronary artery disease, history of single-vessel CABG, history of aortic valve replacement with severe aortic stenosis, history of mitral valve replacement with nonrheumatic mitral stenosis, pulmonary hypertension, cardiac pacemaker, postoperative atrial fibrillation following valve repair, history of left rib fracture following fall, DM 2 with insulin dependence, history of Hodgkin's lymphoma, follicular large cell lymphoma, hypertension, hypothyroidism, splenectomy in 1979 and GERD.    She lives in [place], [place] near [place]. She is . She has 4 kids. 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 No excessive alcohol use. No illicit drug use. Never smoker. She would like to establish cardiology care at [facility].     History:  1. Coronary artery disease s/p coronary artery bypass graft x 1 (8/30/19)              A. S/p balloon angioplasty of ostial RCA (7/16/2019)  2. Status post Aortic valve replacement with 23 mm LivaNova Top Hat mechanical valve for Low flow low gradient severe aortic stenosis (8/30/19)  3. Status post 27 mm St. Reji mechanical mitral valve replacement for mitral stenosis  (8/30/19)  4. Pulmonary hypertension              A. [facility] 7/16/2019: RAP 5, PA 41/18/28, PAWP 16, Karime CO/CI 5.17/2.54, PVR 1.55 wood units  5. Dual chamber pacemaker (9/10/2019)- secondary to heart block  6. Postoperative atrial fibrillation (9/2019)- Resolved  7. Left rib fractures (6/2019)  8. Diabetes  mellitus 2, Insulin Dependent  9. Hodgkin's Lymphoma s/p radiation ()     At her last visit patient reported that she continued to feel short of breath, fatigued and limited energy level following AVR, MVR and CABG x1. She describes this as variable, some days better then others. She completed short duration of rehab. She denied any chest pain or pressure.She also reported increased LE edema, no reports of orthopnea or PND.  Her exercise capacity has mildly improved.  She denied any lightheadedness or recent syncope.    With mechanical valves she will require lifelong oral anticoagulation with coumadin and will require bridging. for any procedures. Goal INR 2.5-3.5. She will also require SBE.prophylaxis with all dental cleanings and procedures.     Today reports that she is doing better. Edema improved with current dose of Lasix. Energy and breathing a bit better with oral iron replacement, post operative anemia. She does not report any new changes today.     Since her last visit, she did complete PFT's which revealed severe restrictive lung disease and mild obstructive disease. There is no chest XR for review, this has also been ordered today. We discussed that this is likely largely contributing to her level of dyspnea.  We did discuss that with severe restrictive lung disease etiology may include pulmonary fibrosis, pulmonary edema, neuromuscular disease, etc. She has been referred to pulmonary medicine for further evaluation and management.    IMAGING RESULTS:   [facility]  [address] [place] [address]     Name: BRIGID MCGOWAN  MRN: [id number]  : 1950  Study Date: 2020 03:08 PM  Age: 69 yrs  Gender: Female  Patient Location: [facility]  Reason For Study: Shortness of breath, Hypertension, unspecified type, History  of  Ordering Physician: MERLIN HILL  Referring Physician: MERLIN HILL  Performed By: Annalise Dalal, PARMINDER, RVT     BSA: 1.9 m2  Height:  65 in  Weight: 193 lb  HR: 86  BP: 183/84 mmHg  _____________________________________________________________________________  __        Procedure  Echocardiogram with two-dimensional, color and spectral Doppler performed.  _____________________________________________________________________________  __        Interpretation Summary  Global and regional left ventricular function is hyperkinetic with an EF of  65-70%.  Right ventricular function, chamber size, wall motion, and thickness are  normal.  A mechanical mitral valve is present.  Mean mitral gradient 7mmHg at heart rate 82BPM.  A mechanical aortic valve is present.  Doppler interrogation of the aortic valve is normal.  The inferior vena cava is normal.  No pericardial effusion is present.  _____________________________________________________________________________  __        Left Ventricle  Global and regional left ventricular function is hyperkinetic with an EF of  65-70%. Left ventricular wall thickness is normal. Left ventricular size is  normal. Diastolic function not assessed due to presence of prosthetic mitral  valve. No regional wall motion abnormalities are seen.     Right Ventricle  Right ventricular function, chamber size, wall motion, and thickness are  normal.     Atria  The atria cannot be assessed.     Mitral Valve  A mechanical mitral valve is present. Mean mitral gradient 7mmHg at heart rate  82BPM.     Aortic Valve  The mean gradient across the aortic valve is6 mmHg. A mechanical aortic valve  is present. Doppler interrogation of the aortic valve is normal.        Tricuspid Valve  Moderate tricuspid insufficiency is present. The right ventricular systolic  pressure is approximated at 38.4 mmHg plus the right atrial pressure.     Pulmonic Valve  The pulmonic valve is normal. Trace pulmonic insufficiency is present.     Vessels  The aorta root is normal. The pulmonary artery and bifurcation cannot be  assessed. The inferior vena cava is  normal.     Pericardium  No pericardial effusion is present.     Compared to Previous Study  Previous study not available for comparison.     _____________________________________________________________________________  __  MMode/2D Measurements & Calculations  IVSd: 1.1 cm  LVIDd: 3.3 cm  LVIDs: 2.4 cm  LVPWd: 0.85 cm  FS: 29.6 %  LV mass(C)d: 93.9 grams  LV mass(C)dI: 48.2 grams/m2     Ao root diam: 3.2 cm  asc Aorta Diam: 3.1 cm  LVOT diam: 2.0 cm  LVOT area: 3.1 cm2  LA Volume (BP): 55.7 ml  LA Volume Index (BP): 28.6 ml/m2  RWT: 0.51        Doppler Measurements & Calculations  MV max P.5 mmHg  MV mean P.0 mmHg  MV V2 VTI: 38.1 cm  MVA(VTI): 1.8 cm2  LV V1 max P.2 mmHg  LV V1 max: 103.0 cm/sec  LV V1 VTI: 21.3 cm     SV(LVOT): 66.9 ml  SI(LVOT): 34.3 ml/m2  TR max stanford: 309.7 cm/sec  TR max P.4 mmHg           _____________________________________________________________________________  __           Report approved by: Amalia Jiménez 2020 03:58 PM     PAST MEDICAL HISTORY:   Past Medical History:   Diagnosis Date     Hodgkin's lymphoma (H)           FAMILY HISTORY:   No family history on file.       PAST SURGICAL HISTORY:   Past Surgical History:   Procedure Laterality Date     AORTIC VALVE REPLACEMENT  2019     left ear drum repair       MITRAL VALVE REPLACEMENT       OOPHORECTOMY       RELEASE CARPAL TUNNEL BILATERAL       TUBAL LIGATION            SOCIAL HISTORY:   Social History     Socioeconomic History     Marital status:      Spouse name: None     Number of children: None     Years of education: None     Highest education level: None   Occupational History     None   Social Needs     Financial resource strain: None     Food insecurity:     Worry: None     Inability: None     Transportation needs:     Medical: None     Non-medical: None   Tobacco Use     Smoking status: Never Smoker     Smokeless tobacco: Never Used   Substance and Sexual Activity     Alcohol  use: Not Currently     Drug use: Never     Sexual activity: Not Currently   Lifestyle     Physical activity:     Days per week: None     Minutes per session: None     Stress: None   Relationships     Social connections:     Talks on phone: None     Gets together: None     Attends Christian service: None     Active member of club or organization: None     Attends meetings of clubs or organizations: None     Relationship status: None     Intimate partner violence:     Fear of current or ex partner: None     Emotionally abused: None     Physically abused: None     Forced sexual activity: None   Other Topics Concern     Parent/sibling w/ CABG, MI or angioplasty before 65F 55M? Not Asked   Social History Narrative     None          CURRENT MEDICATIONS:   Prior to Admission medications    Medication Sig Start Date End Date Taking? Authorizing Provider   acetaminophen (TYLENOL) 500 MG tablet Take 1,000 mg by mouth 9/11/19  Yes Reported, Patient   aspirin 81 MG EC tablet Take 81 mg by mouth 3/8/07  Yes Reported, Patient   atenolol (TENORMIN) 25 MG tablet Take 12.5 mg by mouth daily  1/8/20  Yes Reported, Patient   atorvastatin (LIPITOR) 40 MG tablet Take 40 mg by mouth 2/14/19  Yes Reported, Patient   furosemide (LASIX) 40 MG tablet Take 20 mg by mouth daily  12/30/19  Yes Reported, Patient   glucagon 1 MG kit Administer IM Glucagon 1mg for unresponsive low blood glucose 10/4/19  Yes Reported, Patient   LANTUS SOLOSTAR 100 UNIT/ML soln INJECT 29 UNITS SUBCUTANEOUSLY EVERY MORNING AND EVERY EVENING 12/9/19  Yes Reported, Patient   levothyroxine (SYNTHROID) 88 MCG tablet Take 88 mcg by mouth 9/25/19  Yes Reported, Patient   nitroGLYcerin (NITROSTAT) 0.4 MG sublingual tablet Place 0.4 mg under the tongue daily as needed 7/18/19  Yes Reported, Patient   NOVOLOG FLEXPEN 100 UNIT/ML soln  1/1/20  Yes Reported, Patient   omeprazole (PRILOSEC) 40 MG DR capsule Take 40 mg by mouth 3/25/19  Yes Reported, Patient   potassium chloride  "ER (K-DUR/KLOR-CON M) 10 MEQ CR tablet  12/30/19  Yes Reported, Patient   SYMBICORT 160-4.5 MCG/ACT Inhaler Inhale 2 puffs into the lungs 2 times daily 1/10/20  Yes Reported, Patient   VENTOLIN  (90 Base) MCG/ACT inhaler INHALE 2 PUFFS EVERY 4 HOURS AS NEEDED FOR COUGHING OR WHEEZING 12/19/19  Yes Reported, Patient   warfarin ANTICOAGULANT (COUMADIN) 2.5 MG tablet Take 5 mg by mouth daily  9/11/19  Yes Reported, Patient          ALLERGIES:   Allergies   Allergen Reactions     Lisinopril Cough     Metformin Unknown     Intolerance-stomach aches  intolerance       Glipizide Itching and Rash          ROS:   CONSTITUTIONAL: No reported fever or chills. No changes in weight.  ENT: No visual disturbance, ear ache, epistaxis or sore throat.   CARDIOVASCULAR: No chest pain, chest pressure or chest discomfort. No palpitations. Lower extremity edema improved.  RESPIRATORY: Positive for chronic shortness of breath and dyspnea upon exertion. No cough, wheezing or hemoptysis.  No orthopnea or PND.  GI: No abdominal pain, melena or hematochezia reported.  : No hematuria or dysuria.   NEUROLOGICAL: No lightheadedness, dizziness, syncope, ataxia, paresthesias or weakness.   HEMATOLOGIC: Positive for post operateive anemia. No bleeding or excessive bruising.   MUSCULOSKELETAL: No new joint pain or swelling, no muscle pain.  ENDOCRINOLOGIC: No temperature intolerance. No hair or skin changes.  SKIN: No abnormal rashes or sores, no unusual itching. Positive for discomfort at pacer pocket site.  PSYCHIATRIC: No history of depression or anxiety. No changes in mood, feeling down or anxious.       PHYSICAL EXAM:   /68 (BP Location: Right arm, Patient Position: Sitting, Cuff Size: Adult Large)   Pulse 96   Temp 97.9  F (36.6  C) (Tympanic)   Resp 16   Ht 1.65 m (5' 4.96\")   Wt 88 kg (194 lb)   SpO2 97%   BMI 32.32 kg/m    GENERAL: The patient is a well-developed, well-nourished, in no apparent distress.  HEENT: Head " is normocephalic and atraumatic. Eyes are symmetrical with normal visual tracking. No icterus, no xanthelasmas. Nares appeared normal without nasal drainage. Mucous membranes are moist, no cyanosis.  NECK: Supple. JVP not visible.   CHEST/ LUNGS: Lungs clear to auscultation, no rales, rhonchi or wheezes, no use of accessory muscles, no retractions, respirations unlabored and normal respiratory rate.   CARDIO: Regular rate and rhythm normal with S1 and S2, no S3 or S4 and no murmur or rub.   ABD: Abdomen is nondistended.   EXTREMITIES: Trace LE edema present.   MUSCULOSKELETAL: No visible joint swelling.   NEUROLOGIC: Alert and oriented X3. Normal speech, gait and affect. No focal neurologic deficits.   SKIN: No jaundice. No rashes or visible skin lesions present. No ecchymosis.     LAB RESULTS:   No results found for any previous visit.      ASSESSMENT:   Ms. Mcpherson is a 69 year old female who presents for cardiology follow-up to visit on 1/15/2020 status post aortic valve replacement, mitral valve replacement and single-vessel CABG on 8/30/2019 at [facility].  Patient has a history of coronary artery disease, history of single-vessel CABG, history of aortic valve replacement with severe aortic stenosis, history of mitral valve replacement with nonrheumatic mitral stenosis, pulmonary hypertension, cardiac pacemaker, postoperative atrial fibrillation following valve repair, history of left rib fracture following fall, DM 2 with insulin dependence, history of Hodgkin's lymphoma, follicular large cell lymphoma, hypertension, hypothyroidism, splenectomy in 1979 and GERD.    PLAN:   1. Coronary artery disease involving native coronary artery of native heart without angina pectoris  S/p single vessel CABG (8/30/19), restenosis of ostial RCA  balloon angioplasty of ostial RCA 7/16/2019  Continue on ASA 81 mg daily.   Continue on Toprol XL for secondary prevention, she will increase her Toprol XL to 25 mg  daily.  Continue on Atorvastatin 40 mg every night for plaque stabilization.     2. History of coronary artery bypass graft x 1 (8/30/2019)  See above.    3. Status post aortic valve replacement (8/30/2019)  Status post Aortic valve replacement with 23 mm LivaNova Top Hat mechanical valve for Low flow low gradient severe aortic stenosis.  TTE reviewed from 1/8/2020 with normal functioning AVR.     4. History of mitral valve replacement with mechanical valve (8/30/2019)  Status post 27 mm St. Reji mechanical mitral valve replacement for mitral stenosis  TTE reviewed from 1/8/2020 with normal functioning MVR.     5. Severe aortic stenosis  See above.    6. Nonrheumatic mitral valve stenosis  See above.    7. Pulmonary hypertension (H)  Pulmonary hypertension likely secondary to valvular heart disease  [facility] 7/16/2019: RAP 5, PA 41/18/28, PAWP 16, Karime CO/CI 5.17/2.54, PVR 1.55 wood unit    8. Cardiac pacemaker in situ  Dual chamber pacer (9/10/19)  Secondary to heart block  Patient will be scheduled to establish care with [facility] device clinic.     9. Postoperative atrial fibrillation (H)  No recurrence.  She does remain on warfarin life long with mechanical valves.    10. Controlled type 2 diabetes mellitus with chronic kidney disease on chronic dialysis, with long-term current use of insulin (H)  Continued management by PCP    11. Hodgkin lymphoma, unspecified Hodgkin lymphoma type, unspecified body region (H) (1970)  1970's with radiation treatment    12. Follicular large cell lymphoma (H)  Has followed with oncology at [facility] in [place]    13. Shortness of breath  Likely multifactorial with PH, anemia and PFT's with severe restrictive lung disease and mild obstructive lung disease.   We did discuss that with severe restrictive lung disease etiology may include pulmonary fibrosis, pulmonary edema, neuromuscular disease, etc. She has been referred to pulmonary medicine for further  evaluation and management.  She does have history of radiation therapy.  Chest XR today.     - CBC with platelets; Future  - X-ray Chest 2 vws*; Future  - PULMONARY MEDICINE REFERRAL  - CBC with platelets    14. Essential hypertension  BP borderline and HR remains in the 90's. She will increase her Toprol XL to 25 mg once daily.    15. Hypothyroidism, unspecified type  Continue on current dose of synthroid    16. Chronic anticoagulation  Life long coumadin oral anticoagulation with mechanical valves.   She will require bridging with lovenox for any procedure.    17. Anemia, unspecified type  Post operative anemia, has been on oral iron supplement.   Repeat lab today.   If HGB not improving, consider oral iron infusion.  Discussed that this anemia may also be contributing to her SCHWAB.     - CBC with platelets    18. Pain in pacemaker pocket  No reports of continued discomfort today.    19. Need for prophylactic antibiotic- Dental procedures with mechanical valve  Life long SBE for dental procedure or cleanings.     - amoxicillin (AMOXIL) 500 MG capsule; Take 2,000 mg 60 min prior to any dental cleaning or procedure.       20. Restrictive lung disease  See above.  - X-ray Chest 2 vws*; Future  - PULMONARY MEDICINE REFERRAL    21. History of radiation therapy  - X-ray Chest 2 vws*; Future    Follow-up with cardiology in 3 months, certainly sooner if needed.     Thank you for allowing me to participate in the care of your patient. Please do not hesitate to contact me if you have any questions.     SHALINI Mckeon CNP CFNP

## 2020-05-19 ENCOUNTER — ANCILLARY PROCEDURE (OUTPATIENT)
Dept: CARDIOLOGY | Facility: CLINIC | Age: 70
End: 2020-05-19
Attending: NURSE PRACTITIONER
Payer: COMMERCIAL

## 2020-05-19 DIAGNOSIS — I49.5 SICK SINUS SYNDROME (H): Primary | ICD-10-CM

## 2020-05-19 DIAGNOSIS — Z95.0 PACEMAKER: ICD-10-CM

## 2020-05-19 PROCEDURE — 93296 REM INTERROG EVL PM/IDS: CPT | Mod: ZF

## 2020-05-19 PROCEDURE — 93294 REM INTERROG EVL PM/LDLS PM: CPT | Mod: ZP | Performed by: INTERNAL MEDICINE

## 2020-05-20 ENCOUNTER — OFFICE VISIT (OUTPATIENT)
Dept: CARDIOLOGY | Facility: OTHER | Age: 70
End: 2020-05-20
Attending: NURSE PRACTITIONER
Payer: COMMERCIAL

## 2020-05-20 VITALS
BODY MASS INDEX: 33.46 KG/M2 | HEART RATE: 100 BPM | RESPIRATION RATE: 16 BRPM | OXYGEN SATURATION: 96 % | SYSTOLIC BLOOD PRESSURE: 130 MMHG | WEIGHT: 196 LBS | TEMPERATURE: 97.1 F | DIASTOLIC BLOOD PRESSURE: 72 MMHG | HEIGHT: 64 IN

## 2020-05-20 DIAGNOSIS — Z95.1 HISTORY OF CORONARY ARTERY BYPASS GRAFT X 1: Primary | ICD-10-CM

## 2020-05-20 DIAGNOSIS — Z79.4 CONTROLLED TYPE 2 DIABETES MELLITUS WITH CHRONIC KIDNEY DISEASE ON CHRONIC DIALYSIS, WITH LONG-TERM CURRENT USE OF INSULIN (H): ICD-10-CM

## 2020-05-20 DIAGNOSIS — Z95.2 STATUS POST AORTIC VALVE REPLACEMENT: ICD-10-CM

## 2020-05-20 DIAGNOSIS — I49.5 SICK SINUS SYNDROME (H): ICD-10-CM

## 2020-05-20 DIAGNOSIS — N18.6 CONTROLLED TYPE 2 DIABETES MELLITUS WITH CHRONIC KIDNEY DISEASE ON CHRONIC DIALYSIS, WITH LONG-TERM CURRENT USE OF INSULIN (H): ICD-10-CM

## 2020-05-20 DIAGNOSIS — Z79.2 NEED FOR PROPHYLACTIC ANTIBIOTIC: ICD-10-CM

## 2020-05-20 DIAGNOSIS — D64.9 ANEMIA, UNSPECIFIED TYPE: ICD-10-CM

## 2020-05-20 DIAGNOSIS — Z95.2 HISTORY OF MITRAL VALVE REPLACEMENT WITH MECHANICAL VALVE: ICD-10-CM

## 2020-05-20 DIAGNOSIS — Z99.2 CONTROLLED TYPE 2 DIABETES MELLITUS WITH CHRONIC KIDNEY DISEASE ON CHRONIC DIALYSIS, WITH LONG-TERM CURRENT USE OF INSULIN (H): ICD-10-CM

## 2020-05-20 DIAGNOSIS — E11.22 CONTROLLED TYPE 2 DIABETES MELLITUS WITH CHRONIC KIDNEY DISEASE ON CHRONIC DIALYSIS, WITH LONG-TERM CURRENT USE OF INSULIN (H): ICD-10-CM

## 2020-05-20 DIAGNOSIS — I25.10 CORONARY ARTERY DISEASE INVOLVING NATIVE HEART WITHOUT ANGINA PECTORIS, UNSPECIFIED VESSEL OR LESION TYPE: ICD-10-CM

## 2020-05-20 DIAGNOSIS — I10 ESSENTIAL HYPERTENSION: ICD-10-CM

## 2020-05-20 DIAGNOSIS — I25.10 CORONARY ARTERY DISEASE INVOLVING NATIVE CORONARY ARTERY OF NATIVE HEART WITHOUT ANGINA PECTORIS: ICD-10-CM

## 2020-05-20 DIAGNOSIS — Z95.0 CARDIAC PACEMAKER IN SITU: ICD-10-CM

## 2020-05-20 DIAGNOSIS — Z79.01 CHRONIC ANTICOAGULATION: ICD-10-CM

## 2020-05-20 DIAGNOSIS — R00.0 TACHYCARDIA: ICD-10-CM

## 2020-05-20 DIAGNOSIS — I27.20 PULMONARY HYPERTENSION (H): ICD-10-CM

## 2020-05-20 DIAGNOSIS — I48.91 POSTOPERATIVE ATRIAL FIBRILLATION (H): ICD-10-CM

## 2020-05-20 DIAGNOSIS — I97.89 POSTOPERATIVE ATRIAL FIBRILLATION (H): ICD-10-CM

## 2020-05-20 LAB
ANION GAP SERPL CALCULATED.3IONS-SCNC: 6 MMOL/L (ref 3–14)
BUN SERPL-MCNC: 21 MG/DL (ref 7–25)
CALCIUM SERPL-MCNC: 9.8 MG/DL (ref 8.6–10.3)
CHLORIDE SERPL-SCNC: 103 MMOL/L (ref 98–107)
CO2 SERPL-SCNC: 29 MMOL/L (ref 21–31)
CREAT SERPL-MCNC: 0.89 MG/DL (ref 0.6–1.2)
ERYTHROCYTE [DISTWIDTH] IN BLOOD BY AUTOMATED COUNT: 17.4 % (ref 10–15)
GFR SERPL CREATININE-BSD FRML MDRD: 63 ML/MIN/{1.73_M2}
GLUCOSE SERPL-MCNC: 123 MG/DL (ref 70–105)
HCT VFR BLD AUTO: 38.8 % (ref 35–47)
HGB BLD-MCNC: 12.1 G/DL (ref 11.7–15.7)
MCH RBC QN AUTO: 31.7 PG (ref 26.5–33)
MCHC RBC AUTO-ENTMCNC: 31.2 G/DL (ref 31.5–36.5)
MCV RBC AUTO: 102 FL (ref 78–100)
PLATELET # BLD AUTO: 472 10E9/L (ref 150–450)
POTASSIUM SERPL-SCNC: 4.3 MMOL/L (ref 3.5–5.1)
RBC # BLD AUTO: 3.82 10E12/L (ref 3.8–5.2)
SODIUM SERPL-SCNC: 138 MMOL/L (ref 134–144)
WBC # BLD AUTO: 6.6 10E9/L (ref 4–11)

## 2020-05-20 PROCEDURE — G0463 HOSPITAL OUTPT CLINIC VISIT: HCPCS

## 2020-05-20 PROCEDURE — 85027 COMPLETE CBC AUTOMATED: CPT | Mod: ZL | Performed by: NURSE PRACTITIONER

## 2020-05-20 PROCEDURE — 99214 OFFICE O/P EST MOD 30 MIN: CPT | Performed by: NURSE PRACTITIONER

## 2020-05-20 PROCEDURE — 80048 BASIC METABOLIC PNL TOTAL CA: CPT | Mod: ZL | Performed by: NURSE PRACTITIONER

## 2020-05-20 PROCEDURE — 36415 COLL VENOUS BLD VENIPUNCTURE: CPT | Mod: ZL | Performed by: NURSE PRACTITIONER

## 2020-05-20 RX ORDER — BUDESONIDE AND FORMOTEROL FUMARATE DIHYDRATE 160; 4.5 UG/1; UG/1
2 AEROSOL RESPIRATORY (INHALATION) 2 TIMES DAILY
COMMUNITY
Start: 2020-03-31

## 2020-05-20 RX ORDER — FERROUS GLUCONATE 324(38)MG
324 TABLET ORAL
Qty: 90 TABLET | Refills: 3 | Status: SHIPPED | OUTPATIENT
Start: 2020-05-20

## 2020-05-20 RX ORDER — PEN NEEDLE, DIABETIC 32GX 5/32"
NEEDLE, DISPOSABLE MISCELLANEOUS
COMMUNITY
Start: 2020-02-18

## 2020-05-20 RX ORDER — METOPROLOL SUCCINATE 50 MG/1
50 TABLET, EXTENDED RELEASE ORAL DAILY
Qty: 90 TABLET | Refills: 1 | Status: SHIPPED | OUTPATIENT
Start: 2020-05-20

## 2020-05-20 RX ORDER — BLOOD SUGAR DIAGNOSTIC
STRIP MISCELLANEOUS
COMMUNITY
Start: 2020-03-18

## 2020-05-20 ASSESSMENT — PAIN SCALES - GENERAL: PAINLEVEL: NO PAIN (0)

## 2020-05-20 ASSESSMENT — MIFFLIN-ST. JEOR: SCORE: 1409.05

## 2020-05-20 NOTE — PATIENT INSTRUCTIONS
You were seen by  SHALINI Alarcon CNP     1. Lab today, we will call you with the results     2. Increase Metoprolol to 50 mg daily    3. Keep track of your blood pressures, we will call you next week to see how they are doing.      You will follow up with [facility]iology in 3 months, sooner if needed.       Please call the cardiology office with problems, questions, or concerns at [phone].    If you experience chest pain, chest pressure, chest tightness, shortness of breath, fainting, lightheadedness, nausea, vomiting, or other concerning symptoms, please report to the Emergency Department or call 911. These symptoms may be emergent, and best treated in the Emergency Department.     Cardiology Nurses  HENRIQUE Tovar, SRINIVAS TYLER LPN  [facility] Cardiology (Unit 3C)  [phone]

## 2020-05-20 NOTE — NURSING NOTE
"Chief Complaint   Patient presents with     Follow Up     3 month follow up       Initial /72 (BP Location: Right arm, Patient Position: Sitting, Cuff Size: Adult Large)   Pulse 100   Temp 97.1  F (36.2  C) (Tympanic)   Resp 16   Ht 1.626 m (5' 4\")   Wt 88.9 kg (196 lb)   SpO2 96%   BMI 33.64 kg/m   Estimated body mass index is 33.64 kg/m  as calculated from the following:    Height as of this encounter: 1.626 m (5' 4\").    Weight as of this encounter: 88.9 kg (196 lb).  Meds Reconciled: complete  Pt is on Aspirin  Pt is on a Statin  PHQ and/or ANGEL reviewed. Pt referred to PCP/MH Provider as appropriate.    Betty Cruz LPN      "

## 2020-05-20 NOTE — PROGRESS NOTES
[facility] HEART CARE   CARDIOLOGY PROGRESS NOTE    Heather Mcpherson   [address] [address]      Alyssa Carlisle     Chief Complaint   Patient presents with     Follow Up     3 month follow up        HPI:   Ms. Mcpherson is a 69 year old female who presents for cardiology follow-up to visit on 2/17/2020 status post aortic valve replacement, mitral valve replacement and single-vessel CABG on 8/30/2019 at [facility].  Patient has a history of coronary artery disease, history of single-vessel CABG, history of aortic valve replacement with severe aortic stenosis, history of mitral valve replacement with nonrheumatic mitral stenosis, pulmonary hypertension, cardiac pacemaker, postoperative atrial fibrillation following valve repair, history of left rib fracture following fall, DM 2 with insulin dependence, history of Hodgkin's lymphoma, follicular large cell lymphoma, hypertension, hypothyroidism, splenectomy in 1979 and GERD.    She lives in [place], [place] near [place]. She is . She has 4 kids. 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 No excessive alcohol use. No illicit drug use. Never smoker. She would like to establish cardiology care at [facility].     History:  1. Coronary artery disease s/p coronary artery bypass graft x 1 (8/30/19)              A. S/p balloon angioplasty of ostial RCA (7/16/2019)  2. Status post Aortic valve replacement with 23 mm LivaNova Top Hat mechanical valve for Low flow low gradient severe aortic stenosis (8/30/19)  3. Status post 27 mm St. Reji mechanical mitral valve replacement for mitral stenosis  (8/30/19)  4. Pulmonary hypertension              A. [facility] 7/16/2019: RAP 5, PA 41/18/28, PAWP 16, Karime CO/CI 5.17/2.54, PVR 1.55 wood units  5. Dual chamber pacemaker (9/10/2019)- secondary to heart block  6. Postoperative atrial fibrillation (9/2019)- Resolved  7. Left rib fractures (6/2019)  8. Diabetes  mellitus 2, Insulin Dependent  9. Hodgkin's Lymphoma s/p radiation (1970's)     With mechanical valves she will require lifelong oral anticoagulation with coumadin and will require bridging. for any procedures. Goal INR 2.5-3.5. She will also require SBE.prophylaxis with all dental cleanings and procedures.     PFT's (1/23/2020) which revealed severe restrictive lung disease and mild obstructive disease. Chest XR with chronic scarring of left lung base.     Since her last visit patient was admitted to [facility] for acute cholecystitis and lap arthur, she was discharged 3 weeks ago. She reports feeling pretty good. No chest pain or pressure. No palpitations or symptoms of AF. No reported lightheadedness or syncope. Some edema after she got out of the hospital which is now resolved. Chronic shortness of breath which has not progressed.      PAST MEDICAL HISTORY:   Past Medical History:   Diagnosis Date     Hodgkin's lymphoma (H) 1979          FAMILY HISTORY:   No family history on file.       PAST SURGICAL HISTORY:   Past Surgical History:   Procedure Laterality Date     AORTIC VALVE REPLACEMENT  08/2019     left ear drum repair  2003     MITRAL VALVE REPLACEMENT       OOPHORECTOMY       RELEASE CARPAL TUNNEL BILATERAL       TUBAL LIGATION            SOCIAL HISTORY:   Social History     Socioeconomic History     Marital status:      Spouse name: None     Number of children: None     Years of education: None     Highest education level: None   Occupational History     None   Social Needs     Financial resource strain: None     Food insecurity:     Worry: None     Inability: None     Transportation needs:     Medical: None     Non-medical: None   Tobacco Use     Smoking status: Never Smoker     Smokeless tobacco: Never Used   Substance and Sexual Activity     Alcohol use: Not Currently     Drug use: Never     Sexual activity: Not Currently   Lifestyle     Physical activity:     Days per week: None     Minutes per  session: None     Stress: None   Relationships     Social connections:     Talks on phone: None     Gets together: None     Attends Cheondoism service: None     Active member of club or organization: None     Attends meetings of clubs or organizations: None     Relationship status: None     Intimate partner violence:     Fear of current or ex partner: None     Emotionally abused: None     Physically abused: None     Forced sexual activity: None   Other Topics Concern     Parent/sibling w/ CABG, MI or angioplasty before 65F 55M? Not Asked   Social History Narrative     None          CURRENT MEDICATIONS:   Prior to Admission medications    Medication Sig Start Date End Date Taking? Authorizing Provider   acetaminophen (TYLENOL) 500 MG tablet Take 1,000 mg by mouth 9/11/19  Yes Reported, Patient   aspirin 81 MG EC tablet Take 81 mg by mouth 3/8/07  Yes Reported, Patient   atenolol (TENORMIN) 25 MG tablet Take 12.5 mg by mouth daily  1/8/20  Yes Reported, Patient   atorvastatin (LIPITOR) 40 MG tablet Take 40 mg by mouth 2/14/19  Yes Reported, Patient   furosemide (LASIX) 40 MG tablet Take 20 mg by mouth daily  12/30/19  Yes Reported, Patient   glucagon 1 MG kit Administer IM Glucagon 1mg for unresponsive low blood glucose 10/4/19  Yes Reported, Patient   LANTUS SOLOSTAR 100 UNIT/ML soln INJECT 29 UNITS SUBCUTANEOUSLY EVERY MORNING AND EVERY EVENING 12/9/19  Yes Reported, Patient   levothyroxine (SYNTHROID) 88 MCG tablet Take 88 mcg by mouth 9/25/19  Yes Reported, Patient   nitroGLYcerin (NITROSTAT) 0.4 MG sublingual tablet Place 0.4 mg under the tongue daily as needed 7/18/19  Yes Reported, Patient   NOVOLOG FLEXPEN 100 UNIT/ML soln  1/1/20  Yes Reported, Patient   omeprazole (PRILOSEC) 40 MG DR capsule Take 40 mg by mouth 3/25/19  Yes Reported, Patient   potassium chloride ER (K-DUR/KLOR-CON M) 10 MEQ CR tablet  12/30/19  Yes Reported, Patient   SYMBICORT 160-4.5 MCG/ACT Inhaler Inhale 2 puffs into the lungs 2 times  "daily 1/10/20  Yes Reported, Patient   VENTOLIN  (90 Base) MCG/ACT inhaler INHALE 2 PUFFS EVERY 4 HOURS AS NEEDED FOR COUGHING OR WHEEZING 12/19/19  Yes Reported, Patient   warfarin ANTICOAGULANT (COUMADIN) 2.5 MG tablet Take 5 mg by mouth daily  9/11/19  Yes Reported, Patient          ALLERGIES:   Allergies   Allergen Reactions     Lisinopril Cough     Metformin Unknown     Intolerance-stomach aches  intolerance       Glipizide Itching and Rash          ROS:   CONSTITUTIONAL: No reported fever or chills. No changes in weight.  ENT: No visual disturbance, ear ache, epistaxis or sore throat.   CARDIOVASCULAR: No chest pain, chest pressure or chest discomfort. No palpitations. Lower extremity edema improved.  RESPIRATORY: Positive for chronic shortness of breath and dyspnea upon exertion without progression. No cough, wheezing or hemoptysis.  No orthopnea or PND.  GI: No reported abdominal pain, melena or hematochezia reported.  : No hematuria or dysuria.   NEUROLOGICAL: No lightheadedness, dizziness, syncope, ataxia, paresthesias or weakness.   HEMATOLOGIC: Positive for post operateive anemia. No bleeding or excessive bruising.   MUSCULOSKELETAL: No new joint pain or swelling, no muscle pain.  ENDOCRINOLOGIC: No temperature intolerance. No hair or skin changes.  SKIN: No abnormal rashes or sores, no unusual itching. Positive for discomfort at pacer pocket site.  PSYCHIATRIC: No history of depression or anxiety. No changes in mood, feeling down or anxious.       PHYSICAL EXAM:   /72 (BP Location: Right arm, Patient Position: Sitting, Cuff Size: Adult Large)   Pulse 100   Temp 97.1  F (36.2  C) (Tympanic)   Resp 16   Ht 1.626 m (5' 4\")   Wt 88.9 kg (196 lb)   SpO2 96%   BMI 33.64 kg/m    GENERAL: The patient is a well-developed, well-nourished, in no apparent distress.  HEENT: Head is normocephalic and atraumatic. Eyes are symmetrical with normal visual tracking. No icterus, no xanthelasmas. Nares " appeared normal without nasal drainage. Mucous membranes are moist, no cyanosis.  NECK: Supple. JVP not visible.   CHEST/ LUNGS: Lungs clear to auscultation, no rales, rhonchi or wheezes, no use of accessory muscles, no retractions, respirations unlabored and normal respiratory rate.   CARDIO: Regular rate and rhythm normal with S1 and S2, no S3 or S4 and no murmur or rub.   ABD: Abdomen is nondistended.   EXTREMITIES: Trace LE edema present.   MUSCULOSKELETAL: No visible joint swelling.   NEUROLOGIC: Alert and oriented X3. Normal speech, gait and affect. No focal neurologic deficits.   SKIN: No jaundice. No rashes or visible skin lesions present. No ecchymosis.     LAB RESULTS:   No results found for any previous visit.      ASSESSMENT:   Ms. Mcpherson is a 69 year old female who presents for cardiology follow-up to visit on 2/17/2020 status post aortic valve replacement, mitral valve replacement and single-vessel CABG on 8/30/2019 at [facility].  Patient has a history of coronary artery disease, history of single-vessel CABG, history of aortic valve replacement with severe aortic stenosis, history of mitral valve replacement with nonrheumatic mitral stenosis, pulmonary hypertension, cardiac pacemaker, postoperative atrial fibrillation following valve repair, history of left rib fracture following fall, DM 2 with insulin dependence, history of Hodgkin's lymphoma, follicular large cell lymphoma, hypertension, hypothyroidism, splenectomy in 1979 and GERD.  Since her last visit patient was admitted to [facility] for acute cholecystitis and lap arthur, she was discharged 3 weeks ago. She reports feeling pretty good. No chest pain or pressure. No palpitations or symptoms of AF. No reported lightheadedness or syncope. Some edema after she got out of the hospital which is now resolved. Chronic shortness of breath which has not progressed.     PLAN:   1. Coronary artery disease involving native heart without angina  pectoris, unspecified vessel or lesion type  S/p single vessel CABG (8/30/19), restenosis of ostial RCA  balloon angioplasty of ostial RCA 7/16/2019  Continue on ASA 81 mg daily.   Continue on Toprol XL for secondary prevention, she will increase her Toprol XL to 50 mg daily.  Continue on Atorvastatin 40 mg every night for plaque stabilization.     - metoprolol succinate ER (TOPROL-XL) 50 MG 24 hr tablet; Take 1 tablet (50 mg) by mouth daily  Dispense: 90 tablet; Refill: 1    2. Anemia, unspecified type  CBC today.  - ferrous gluconate (FERGON) 324 (38 Fe) MG tablet; Take 1 tablet (324 mg) by mouth daily (with breakfast)  Dispense: 90 tablet; Refill: 3    3. Coronary artery disease involving native coronary artery of native heart without angina pectoris  See above.    4. History of coronary artery bypass graft x 1 (8/30/2019)  See above.    5. Status post aortic valve replacement (8/30/2019)  Status post Aortic valve replacement with 23 mm LivaNova Top Hat mechanical valve for Low flow low gradient severe aortic stenosis.  TTE reviewed from 1/8/2020 with normal functioning AVR.     - CBC with platelets  - Basic metabolic panel    6. History of mitral valve replacement with mechanical valve (8/30/2019)  Status post 27 mm St. Reji mechanical mitral valve replacement for mitral stenosis  TTE reviewed from 1/8/2020 with normal functioning MVR.     7. Pulmonary hypertension (H)  Pulmonary hypertension likely secondary to valvular heart disease  [facility] 7/16/2019: RAP 5, PA 41/18/28, PAWP 16, Karime CO/CI 5.17/2.54, PVR 1.55 wood unit    8. Cardiac pacemaker in situ  Dual chamber pacer (9/10/19)  Secondary to heart block    9. Postoperative atrial fibrillation (H)  No recurrence.  She does remain on warfarin life long with mechanical valves.    10. Controlled type 2 diabetes mellitus with chronic kidney disease on chronic dialysis, with long-term current use of insulin (H)  Continued management by PCP    11.  Essential hypertension  BP borderline and HR remains in the 90's. She will increase her Toprol XL to 50 mg once daily.    13. Chronic anticoagulation  Life long coumadin oral anticoagulation with mechanical valves.     14. Need for prophylactic antibiotic- Dental procedures with mechanical valve  Life long SBE for dental procedure or cleanings.     15. Tachycardia  BP borderline and HR remains in the 90's. She will increase her Toprol XL to 50 mg once daily.          Follow-up with cardiology in 3 months, certainly sooner if needed.     Thank you for allowing me to participate in the care of your patient. Please do not hesitate to contact me if you have any questions.     Sharyn Cook, APRN CNP CFNP

## 2020-05-28 LAB
MDC_IDC_LEAD_IMPLANT_DT: NORMAL
MDC_IDC_LEAD_IMPLANT_DT: NORMAL
MDC_IDC_LEAD_LOCATION: NORMAL
MDC_IDC_LEAD_LOCATION: NORMAL
MDC_IDC_LEAD_LOCATION_DETAIL_1: NORMAL
MDC_IDC_LEAD_LOCATION_DETAIL_1: NORMAL
MDC_IDC_LEAD_MFG: NORMAL
MDC_IDC_LEAD_MFG: NORMAL
MDC_IDC_LEAD_MODEL: NORMAL
MDC_IDC_LEAD_MODEL: NORMAL
MDC_IDC_LEAD_POLARITY_TYPE: NORMAL
MDC_IDC_LEAD_POLARITY_TYPE: NORMAL
MDC_IDC_LEAD_SERIAL: NORMAL
MDC_IDC_LEAD_SERIAL: NORMAL
MDC_IDC_LEAD_SPECIAL_FUNCTION: NORMAL
MDC_IDC_LEAD_SPECIAL_FUNCTION: NORMAL
MDC_IDC_MSMT_BATTERY_DTM: NORMAL
MDC_IDC_MSMT_BATTERY_REMAINING_LONGEVITY: 176 MO
MDC_IDC_MSMT_BATTERY_RRT_TRIGGER: 2.62
MDC_IDC_MSMT_BATTERY_STATUS: NORMAL
MDC_IDC_MSMT_BATTERY_VOLTAGE: 3.11 V
MDC_IDC_MSMT_LEADCHNL_RA_IMPEDANCE_VALUE: 266 OHM
MDC_IDC_MSMT_LEADCHNL_RA_IMPEDANCE_VALUE: 380 OHM
MDC_IDC_MSMT_LEADCHNL_RA_PACING_THRESHOLD_AMPLITUDE: 0.88 V
MDC_IDC_MSMT_LEADCHNL_RA_PACING_THRESHOLD_PULSEWIDTH: 0.4 MS
MDC_IDC_MSMT_LEADCHNL_RA_SENSING_INTR_AMPL: 1.88 MV
MDC_IDC_MSMT_LEADCHNL_RV_IMPEDANCE_VALUE: 323 OHM
MDC_IDC_MSMT_LEADCHNL_RV_IMPEDANCE_VALUE: 418 OHM
MDC_IDC_MSMT_LEADCHNL_RV_PACING_THRESHOLD_AMPLITUDE: 0.5 V
MDC_IDC_MSMT_LEADCHNL_RV_PACING_THRESHOLD_PULSEWIDTH: 0.4 MS
MDC_IDC_MSMT_LEADCHNL_RV_SENSING_INTR_AMPL: 5 MV
MDC_IDC_PG_IMPLANT_DTM: NORMAL
MDC_IDC_PG_MFG: NORMAL
MDC_IDC_PG_MODEL: NORMAL
MDC_IDC_PG_SERIAL: NORMAL
MDC_IDC_PG_TYPE: NORMAL
MDC_IDC_SESS_CLINIC_NAME: NORMAL
MDC_IDC_SESS_DTM: NORMAL
MDC_IDC_SESS_TYPE: NORMAL
MDC_IDC_SET_BRADY_AT_MODE_SWITCH_RATE: 171 {BEATS}/MIN
MDC_IDC_SET_BRADY_HYSTRATE: NORMAL
MDC_IDC_SET_BRADY_LOWRATE: 60 {BEATS}/MIN
MDC_IDC_SET_BRADY_MAX_SENSOR_RATE: 140 {BEATS}/MIN
MDC_IDC_SET_BRADY_MAX_TRACKING_RATE: 140 {BEATS}/MIN
MDC_IDC_SET_BRADY_MODE: NORMAL
MDC_IDC_SET_BRADY_PAV_DELAY_LOW: 180 MS
MDC_IDC_SET_BRADY_SAV_DELAY_LOW: 150 MS
MDC_IDC_SET_LEADCHNL_RA_PACING_AMPLITUDE: 1.75 V
MDC_IDC_SET_LEADCHNL_RA_PACING_ANODE_ELECTRODE_1: NORMAL
MDC_IDC_SET_LEADCHNL_RA_PACING_ANODE_LOCATION_1: NORMAL
MDC_IDC_SET_LEADCHNL_RA_PACING_CAPTURE_MODE: NORMAL
MDC_IDC_SET_LEADCHNL_RA_PACING_CATHODE_ELECTRODE_1: NORMAL
MDC_IDC_SET_LEADCHNL_RA_PACING_CATHODE_LOCATION_1: NORMAL
MDC_IDC_SET_LEADCHNL_RA_PACING_POLARITY: NORMAL
MDC_IDC_SET_LEADCHNL_RA_PACING_PULSEWIDTH: 0.4 MS
MDC_IDC_SET_LEADCHNL_RA_SENSING_ANODE_ELECTRODE_1: NORMAL
MDC_IDC_SET_LEADCHNL_RA_SENSING_ANODE_LOCATION_1: NORMAL
MDC_IDC_SET_LEADCHNL_RA_SENSING_CATHODE_ELECTRODE_1: NORMAL
MDC_IDC_SET_LEADCHNL_RA_SENSING_CATHODE_LOCATION_1: NORMAL
MDC_IDC_SET_LEADCHNL_RA_SENSING_POLARITY: NORMAL
MDC_IDC_SET_LEADCHNL_RA_SENSING_SENSITIVITY: 0.3 MV
MDC_IDC_SET_LEADCHNL_RV_PACING_AMPLITUDE: 2 V
MDC_IDC_SET_LEADCHNL_RV_PACING_ANODE_ELECTRODE_1: NORMAL
MDC_IDC_SET_LEADCHNL_RV_PACING_ANODE_LOCATION_1: NORMAL
MDC_IDC_SET_LEADCHNL_RV_PACING_CAPTURE_MODE: NORMAL
MDC_IDC_SET_LEADCHNL_RV_PACING_CATHODE_ELECTRODE_1: NORMAL
MDC_IDC_SET_LEADCHNL_RV_PACING_CATHODE_LOCATION_1: NORMAL
MDC_IDC_SET_LEADCHNL_RV_PACING_POLARITY: NORMAL
MDC_IDC_SET_LEADCHNL_RV_PACING_PULSEWIDTH: 0.4 MS
MDC_IDC_SET_LEADCHNL_RV_SENSING_ANODE_ELECTRODE_1: NORMAL
MDC_IDC_SET_LEADCHNL_RV_SENSING_ANODE_LOCATION_1: NORMAL
MDC_IDC_SET_LEADCHNL_RV_SENSING_CATHODE_ELECTRODE_1: NORMAL
MDC_IDC_SET_LEADCHNL_RV_SENSING_CATHODE_LOCATION_1: NORMAL
MDC_IDC_SET_LEADCHNL_RV_SENSING_POLARITY: NORMAL
MDC_IDC_SET_LEADCHNL_RV_SENSING_SENSITIVITY: 0.9 MV
MDC_IDC_SET_ZONE_DETECTION_INTERVAL: 350 MS
MDC_IDC_SET_ZONE_DETECTION_INTERVAL: 400 MS
MDC_IDC_SET_ZONE_TYPE: NORMAL
MDC_IDC_STAT_BRADY_AP_VP_PERCENT: 0 %
MDC_IDC_STAT_BRADY_AP_VS_PERCENT: 0.06 %
MDC_IDC_STAT_BRADY_AS_VP_PERCENT: 0.03 %
MDC_IDC_STAT_BRADY_AS_VS_PERCENT: 99.91 %
MDC_IDC_STAT_BRADY_DTM_END: NORMAL
MDC_IDC_STAT_BRADY_DTM_START: NORMAL
MDC_IDC_STAT_BRADY_RA_PERCENT_PACED: 0.09 %
MDC_IDC_STAT_BRADY_RV_PERCENT_PACED: 0.03 %
MDC_IDC_STAT_EPISODE_RECENT_COUNT: 0
MDC_IDC_STAT_EPISODE_RECENT_COUNT_DTM_END: NORMAL
MDC_IDC_STAT_EPISODE_RECENT_COUNT_DTM_START: NORMAL
MDC_IDC_STAT_EPISODE_TOTAL_COUNT: 0
MDC_IDC_STAT_EPISODE_TOTAL_COUNT: 0
MDC_IDC_STAT_EPISODE_TOTAL_COUNT: 2
MDC_IDC_STAT_EPISODE_TOTAL_COUNT_DTM_END: NORMAL
MDC_IDC_STAT_EPISODE_TOTAL_COUNT_DTM_START: NORMAL
MDC_IDC_STAT_EPISODE_TYPE: NORMAL

## 2020-06-03 ENCOUNTER — TELEPHONE (OUTPATIENT)
Dept: CARDIOLOGY | Facility: OTHER | Age: 70
End: 2020-06-03

## 2020-06-03 NOTE — TELEPHONE ENCOUNTER
Called to talk to patient about blood pressures and he daughter answered the phone stating that patient passed away last week.  Betty Cruz LPN .......6/3/2020 9:20 AM